# Patient Record
Sex: FEMALE | Race: WHITE | NOT HISPANIC OR LATINO | Employment: UNEMPLOYED | URBAN - METROPOLITAN AREA
[De-identification: names, ages, dates, MRNs, and addresses within clinical notes are randomized per-mention and may not be internally consistent; named-entity substitution may affect disease eponyms.]

---

## 2019-03-20 ENCOUNTER — TELEPHONE (OUTPATIENT)
Dept: NEUROLOGY | Facility: CLINIC | Age: 44
End: 2019-03-20

## 2019-03-20 NOTE — TELEPHONE ENCOUNTER
Patient given numbers for [facility], [facility] and Dr. Winter for a sooner appt.  Unable to put referral in Epic for migraines and seizures, cannot find Dr. Kumar Diamond.  Referral sent to scanning.

## 2019-03-20 NOTE — TELEPHONE ENCOUNTER
----- Message from Macey Alvarez sent at 3/20/2019  1:32 PM CDT -----  Contact: patient  Type: Needs Medical Advice    Who Called:  patient  Best Call Back Number: [phone]  Additional Information: would like for the nurse to give her a call back because the number that was given to her did not have any openings.

## 2019-03-20 NOTE — TELEPHONE ENCOUNTER
Patient notified that the schedule is not open past June and there are no more medicaid spots at this time.

## 2019-03-20 NOTE — TELEPHONE ENCOUNTER
----- Message from Jassi Monae sent at 3/20/2019 12:03 PM CDT -----  Contact: Patient  Advised she has been referred by her previous Dr. Kumar Diamond who faxed the referral to [phone] Attn: Trena for Dr. Pond.  The patient has not heard back from anyone to schedule an appnt.  Confirmed the correct fax number for Dr. Pond is [phone].  The ptnt has Medicaid   Please call [phone]

## 2022-08-11 ENCOUNTER — HOSPITAL ENCOUNTER (INPATIENT)
Facility: HOSPITAL | Age: 47
LOS: 7 days | Discharge: HOME | DRG: 442 | End: 2022-08-18
Attending: EMERGENCY MEDICINE | Admitting: INTERNAL MEDICINE
Payer: MEDICAID

## 2022-08-11 DIAGNOSIS — N39.0 URINARY TRACT INFECTION WITHOUT HEMATURIA, SITE UNSPECIFIED: ICD-10-CM

## 2022-08-11 DIAGNOSIS — R10.9 ABDOMINAL PAIN, UNSPECIFIED ABDOMINAL LOCATION: Primary | ICD-10-CM

## 2022-08-11 DIAGNOSIS — R07.9 CHEST PAIN: ICD-10-CM

## 2022-08-11 DIAGNOSIS — R17 JAUNDICE: ICD-10-CM

## 2022-08-11 DIAGNOSIS — K75.9 HEPATITIS: ICD-10-CM

## 2022-08-11 DIAGNOSIS — E80.6 HYPERBILIRUBINEMIA: ICD-10-CM

## 2022-08-11 DIAGNOSIS — R74.01 TRANSAMINITIS: ICD-10-CM

## 2022-08-11 PROBLEM — E87.6 HYPOKALEMIA: Status: ACTIVE | Noted: 2022-08-11

## 2022-08-11 LAB
ALBUMIN SERPL BCP-MCNC: 3.3 G/DL (ref 3.5–5.2)
ALP SERPL-CCNC: 115 U/L (ref 55–135)
ALT SERPL W/O P-5'-P-CCNC: 1319 U/L (ref 10–44)
ANION GAP SERPL CALC-SCNC: 8 MMOL/L (ref 8–16)
AST SERPL-CCNC: 1733 U/L (ref 10–40)
B-HCG UR QL: NEGATIVE
BACTERIA #/AREA URNS HPF: ABNORMAL /HPF
BASOPHILS # BLD AUTO: 0.07 K/UL (ref 0–0.2)
BASOPHILS NFR BLD: 1.2 % (ref 0–1.9)
BILIRUB SERPL-MCNC: 15.2 MG/DL (ref 0.1–1)
BILIRUB UR QL STRIP: ABNORMAL
BUN SERPL-MCNC: 8 MG/DL (ref 6–20)
CALCIUM SERPL-MCNC: 8.7 MG/DL (ref 8.7–10.5)
CHLORIDE SERPL-SCNC: 104 MMOL/L (ref 95–110)
CLARITY UR: ABNORMAL
CO2 SERPL-SCNC: 21 MMOL/L (ref 23–29)
COLOR UR: ABNORMAL
CREAT SERPL-MCNC: 0.5 MG/DL (ref 0.5–1.4)
CTP QC/QA: YES
DIFFERENTIAL METHOD: ABNORMAL
EOSINOPHIL # BLD AUTO: 0 K/UL (ref 0–0.5)
EOSINOPHIL NFR BLD: 0.3 % (ref 0–8)
ERYTHROCYTE [DISTWIDTH] IN BLOOD BY AUTOMATED COUNT: 19.2 % (ref 11.5–14.5)
EST. GFR  (NO RACE VARIABLE): >60 ML/MIN/1.73 M^2
GLUCOSE SERPL-MCNC: 122 MG/DL (ref 70–110)
GLUCOSE UR QL STRIP: ABNORMAL
HCT VFR BLD AUTO: 45.7 % (ref 37–48.5)
HGB BLD-MCNC: 15 G/DL (ref 12–16)
HGB UR QL STRIP: ABNORMAL
IMM GRANULOCYTES # BLD AUTO: 0.01 K/UL (ref 0–0.04)
IMM GRANULOCYTES NFR BLD AUTO: 0.2 % (ref 0–0.5)
KETONES UR QL STRIP: ABNORMAL
LEUKOCYTE ESTERASE UR QL STRIP: ABNORMAL
LIPASE SERPL-CCNC: 34 U/L (ref 4–60)
LYMPHOCYTES # BLD AUTO: 0.9 K/UL (ref 1–4.8)
LYMPHOCYTES NFR BLD: 15.8 % (ref 18–48)
MCH RBC QN AUTO: 27.8 PG (ref 27–31)
MCHC RBC AUTO-ENTMCNC: 32.8 G/DL (ref 32–36)
MCV RBC AUTO: 85 FL (ref 82–98)
MICROSCOPIC COMMENT: ABNORMAL
MONOCYTES # BLD AUTO: 0.7 K/UL (ref 0.3–1)
MONOCYTES NFR BLD: 11.8 % (ref 4–15)
NEUTROPHILS # BLD AUTO: 4.2 K/UL (ref 1.8–7.7)
NEUTROPHILS NFR BLD: 70.7 % (ref 38–73)
NITRITE UR QL STRIP: ABNORMAL
NRBC BLD-RTO: 0 /100 WBC
PH UR STRIP: ABNORMAL [PH] (ref 5–8)
PLATELET # BLD AUTO: 370 K/UL (ref 150–450)
PMV BLD AUTO: 9.3 FL (ref 9.2–12.9)
POTASSIUM SERPL-SCNC: 3.4 MMOL/L (ref 3.5–5.1)
PROT SERPL-MCNC: 6.6 G/DL (ref 6–8.4)
PROT UR QL STRIP: ABNORMAL
RBC # BLD AUTO: 5.4 M/UL (ref 4–5.4)
RBC #/AREA URNS HPF: 1 /HPF (ref 0–4)
SODIUM SERPL-SCNC: 133 MMOL/L (ref 136–145)
SP GR UR STRIP: ABNORMAL (ref 1–1.03)
SQUAMOUS #/AREA URNS HPF: 20 /HPF
URN SPEC COLLECT METH UR: ABNORMAL
UROBILINOGEN UR STRIP-ACNC: ABNORMAL EU/DL
WBC # BLD AUTO: 5.95 K/UL (ref 3.9–12.7)
WBC #/AREA URNS HPF: 40 /HPF (ref 0–5)
WBC CLUMPS URNS QL MICRO: ABNORMAL

## 2022-08-11 PROCEDURE — 25000003 PHARM REV CODE 250: Performed by: NURSE PRACTITIONER

## 2022-08-11 PROCEDURE — 81025 URINE PREGNANCY TEST: CPT | Performed by: EMERGENCY MEDICINE

## 2022-08-11 PROCEDURE — 25000003 PHARM REV CODE 250: Performed by: EMERGENCY MEDICINE

## 2022-08-11 PROCEDURE — 25000003 PHARM REV CODE 250: Performed by: INTERNAL MEDICINE

## 2022-08-11 PROCEDURE — 83690 ASSAY OF LIPASE: CPT | Performed by: EMERGENCY MEDICINE

## 2022-08-11 PROCEDURE — 99285 EMERGENCY DEPT VISIT HI MDM: CPT | Mod: 25

## 2022-08-11 PROCEDURE — 63600175 PHARM REV CODE 636 W HCPCS: Performed by: NURSE PRACTITIONER

## 2022-08-11 PROCEDURE — 12000002 HC ACUTE/MED SURGE SEMI-PRIVATE ROOM

## 2022-08-11 PROCEDURE — 96365 THER/PROPH/DIAG IV INF INIT: CPT

## 2022-08-11 PROCEDURE — G0378 HOSPITAL OBSERVATION PER HR: HCPCS

## 2022-08-11 PROCEDURE — 25500020 PHARM REV CODE 255: Performed by: EMERGENCY MEDICINE

## 2022-08-11 PROCEDURE — 80053 COMPREHEN METABOLIC PANEL: CPT | Performed by: EMERGENCY MEDICINE

## 2022-08-11 PROCEDURE — 85025 COMPLETE CBC W/AUTO DIFF WBC: CPT | Performed by: EMERGENCY MEDICINE

## 2022-08-11 PROCEDURE — 81001 URINALYSIS AUTO W/SCOPE: CPT | Performed by: EMERGENCY MEDICINE

## 2022-08-11 PROCEDURE — 63600175 PHARM REV CODE 636 W HCPCS: Performed by: EMERGENCY MEDICINE

## 2022-08-11 PROCEDURE — 96366 THER/PROPH/DIAG IV INF ADDON: CPT

## 2022-08-11 PROCEDURE — 96375 TX/PRO/DX INJ NEW DRUG ADDON: CPT

## 2022-08-11 PROCEDURE — 87086 URINE CULTURE/COLONY COUNT: CPT | Performed by: EMERGENCY MEDICINE

## 2022-08-11 RX ORDER — NALOXONE HCL 0.4 MG/ML
0.02 VIAL (ML) INJECTION
Status: DISCONTINUED | OUTPATIENT
Start: 2022-08-11 | End: 2022-08-18 | Stop reason: HOSPADM

## 2022-08-11 RX ORDER — TALC
6 POWDER (GRAM) TOPICAL NIGHTLY PRN
Status: DISCONTINUED | OUTPATIENT
Start: 2022-08-11 | End: 2022-08-18 | Stop reason: HOSPADM

## 2022-08-11 RX ORDER — IBUPROFEN 200 MG
16 TABLET ORAL
Status: DISCONTINUED | OUTPATIENT
Start: 2022-08-11 | End: 2022-08-18 | Stop reason: HOSPADM

## 2022-08-11 RX ORDER — RIMEGEPANT SULFATE 75 MG/75MG
75 TABLET, ORALLY DISINTEGRATING ORAL ONCE AS NEEDED
Status: ON HOLD | COMMUNITY
End: 2022-08-18 | Stop reason: HOSPADM

## 2022-08-11 RX ORDER — PROMETHAZINE HYDROCHLORIDE 25 MG/1
25 TABLET ORAL 3 TIMES DAILY PRN
Status: ON HOLD | COMMUNITY
End: 2022-08-18 | Stop reason: HOSPADM

## 2022-08-11 RX ORDER — SODIUM CHLORIDE 9 MG/ML
INJECTION, SOLUTION INTRAVENOUS CONTINUOUS
Status: DISCONTINUED | OUTPATIENT
Start: 2022-08-11 | End: 2022-08-14

## 2022-08-11 RX ORDER — HYDROMORPHONE HYDROCHLORIDE 1 MG/ML
0.5 INJECTION, SOLUTION INTRAMUSCULAR; INTRAVENOUS; SUBCUTANEOUS ONCE
Status: COMPLETED | OUTPATIENT
Start: 2022-08-11 | End: 2022-08-11

## 2022-08-11 RX ORDER — UBROGEPANT 100 MG/1
TABLET ORAL
Status: ON HOLD | COMMUNITY
End: 2022-08-18 | Stop reason: HOSPADM

## 2022-08-11 RX ORDER — IBUPROFEN 200 MG
24 TABLET ORAL
Status: DISCONTINUED | OUTPATIENT
Start: 2022-08-11 | End: 2022-08-18 | Stop reason: HOSPADM

## 2022-08-11 RX ORDER — PROCHLORPERAZINE EDISYLATE 5 MG/ML
5 INJECTION INTRAMUSCULAR; INTRAVENOUS EVERY 6 HOURS PRN
Status: DISCONTINUED | OUTPATIENT
Start: 2022-08-11 | End: 2022-08-18 | Stop reason: HOSPADM

## 2022-08-11 RX ORDER — SODIUM CHLORIDE 0.9 % (FLUSH) 0.9 %
3 SYRINGE (ML) INJECTION EVERY 8 HOURS
Status: DISCONTINUED | OUTPATIENT
Start: 2022-08-11 | End: 2022-08-17

## 2022-08-11 RX ORDER — POTASSIUM CHLORIDE 20 MEQ/1
40 TABLET, EXTENDED RELEASE ORAL ONCE
Status: DISCONTINUED | OUTPATIENT
Start: 2022-08-11 | End: 2022-08-11

## 2022-08-11 RX ORDER — GLUCAGON 1 MG
1 KIT INJECTION
Status: DISCONTINUED | OUTPATIENT
Start: 2022-08-11 | End: 2022-08-18 | Stop reason: HOSPADM

## 2022-08-11 RX ORDER — ACETAMINOPHEN 325 MG/1
650 TABLET ORAL EVERY 4 HOURS PRN
Status: DISCONTINUED | OUTPATIENT
Start: 2022-08-11 | End: 2022-08-11

## 2022-08-11 RX ORDER — MAG HYDROX/ALUMINUM HYD/SIMETH 200-200-20
30 SUSPENSION, ORAL (FINAL DOSE FORM) ORAL 4 TIMES DAILY PRN
Status: DISCONTINUED | OUTPATIENT
Start: 2022-08-11 | End: 2022-08-18 | Stop reason: HOSPADM

## 2022-08-11 RX ORDER — ONDANSETRON 2 MG/ML
4 INJECTION INTRAMUSCULAR; INTRAVENOUS EVERY 8 HOURS PRN
Status: DISCONTINUED | OUTPATIENT
Start: 2022-08-11 | End: 2022-08-11

## 2022-08-11 RX ORDER — ONDANSETRON 2 MG/ML
4 INJECTION INTRAMUSCULAR; INTRAVENOUS EVERY 8 HOURS PRN
Status: DISCONTINUED | OUTPATIENT
Start: 2022-08-11 | End: 2022-08-18 | Stop reason: HOSPADM

## 2022-08-11 RX ORDER — OXYCODONE HYDROCHLORIDE 5 MG/1
5 TABLET ORAL EVERY 4 HOURS PRN
Status: DISCONTINUED | OUTPATIENT
Start: 2022-08-11 | End: 2022-08-18 | Stop reason: HOSPADM

## 2022-08-11 RX ORDER — GALCANEZUMAB 120 MG/ML
120 INJECTION, SOLUTION SUBCUTANEOUS
Status: ON HOLD | COMMUNITY
End: 2022-08-18 | Stop reason: HOSPADM

## 2022-08-11 RX ADMIN — IOHEXOL 100 ML: 350 INJECTION, SOLUTION INTRAVENOUS at 02:08

## 2022-08-11 RX ADMIN — ONDANSETRON 4 MG: 2 INJECTION INTRAMUSCULAR; INTRAVENOUS at 02:08

## 2022-08-11 RX ADMIN — POTASSIUM BICARBONATE 40 MEQ: 391 TABLET, EFFERVESCENT ORAL at 02:08

## 2022-08-11 RX ADMIN — SODIUM CHLORIDE: 0.9 INJECTION, SOLUTION INTRAVENOUS at 02:08

## 2022-08-11 RX ADMIN — HYDROMORPHONE HYDROCHLORIDE 0.5 MG: 1 INJECTION, SOLUTION INTRAMUSCULAR; INTRAVENOUS; SUBCUTANEOUS at 02:08

## 2022-08-11 RX ADMIN — OXYCODONE HYDROCHLORIDE 5 MG: 5 TABLET ORAL at 08:08

## 2022-08-11 RX ADMIN — CEFTRIAXONE 1 G: 1 INJECTION, SOLUTION INTRAVENOUS at 02:08

## 2022-08-11 NOTE — ASSESSMENT & PLAN NOTE
Patient presented to ED with c/o Abdominal pain for several weeks. She has significant medical history but migraines.   Bilirubin 15.2, AST 1733, ALT 1319, Lipase 34  Hepatitis panel pending.   Abdominal ultrasound impression  Normal size and sonographic appearance of the liver.   2. Pronounced gallbladder thickening without gallstones, nonspecific. This could be due to intrinsic liver disease and or hypoproteinemia, or acalculous cholecystitis.   3. Multiple simple right renal cysts.   CT Abdomen/Pelvis pending  GI consulted. Appreciate rec's  Zofran IV prn   NS @100ccc/hr  Trend labs

## 2022-08-11 NOTE — HPI
Ms. Pat is a 47-year-old female with PMHx chronic migraines and a remote history of seizure disorder. She presented to [facility] Emergency room with complaints abdominal pain for several weeks. Associated symptoms nausea and vomiting. Today she needed to come to the ED because she was becoming lethargic at times due to abdominal pain. She endorses being anorectic and has lost 10 lbs is during this time. She endorses being very fatigued and not being able to do her daily functioning. Her stools have been light and urine dark. She denies any history of hepatitis, pancreatitis, or other GI diseases. Patient denies being around anyone sick or going out of the country. He diet has not changed.Denies fever or chills. In the ED her initial workup Potassium 3.4, Sodium 133, Bilirubin 15.2, AST 1433, ALT 1319, Lipase 34. Urinalysis resulted WBC 40, few bacteria. She was treated in the ED with Rocephin 1g IV. Abdominal ultrasound impressions were gallbladder thickening with no stones. GI has been consulted. Abdominal/pelvis CT pending, Hepatitis panel pending. Patient will be admitted to Hospital Medicine for further workup and medical management.

## 2022-08-11 NOTE — ED PROVIDER NOTES
Encounter Date: 8/11/2022       History     Chief Complaint   Patient presents with    Abdominal Pain    Jaundice     47-year-old female who has a prior history of chronic migraines and a remote history of seizure disorder, presents emergency room with complaints of having mid abdominal pain for the last couple of weeks.  Patient states that 3-4 days ago she noted that she was becoming jaundiced.  She denies any fever chills.  No flank pain.  No known history of any gallbladder or liver disease.  Patient admits being anorectic and has lost 10 lb over the last couple weeks.  She has had nausea and vomiting.  She admits that stools have been light in appearance and urine is dark.  She does also complain of increased fatigue.  No prior history of hepatitis, pancreatitis or peptic ulcer disease.  She denies any ill contacts with any other 1 with similar symptoms.        Review of patient's allergies indicates:  No Known Allergies  No past medical history on file.  No past surgical history on file.  No family history on file.     Review of Systems   Constitutional: Positive for appetite change. Negative for chills, diaphoresis and fever.   HENT: Negative.  Negative for sore throat and trouble swallowing.    Respiratory: Negative for cough and shortness of breath.    Cardiovascular: Negative for chest pain and palpitations.   Gastrointestinal: Positive for abdominal pain, nausea and vomiting.   Genitourinary: Negative for difficulty urinating and dysuria.   Musculoskeletal: Negative for back pain.   Skin: Positive for color change. Negative for rash.   Neurological: Negative for weakness.   Hematological: Does not bruise/bleed easily.   All other systems reviewed and are negative.      Physical Exam     Initial Vitals [08/11/22 1015]   BP Pulse Resp Temp SpO2   133/66 95 18 98.2 °F (36.8 °C) 98 %      MAP       --         Physical Exam    Vitals reviewed.  Constitutional: She appears well-developed and well-nourished.  She is not diaphoretic. No distress.   HENT:   Head: Normocephalic and atraumatic.   Nose: Nose normal.   Mouth/Throat: Oropharynx is clear and moist. No oropharyngeal exudate.   Eyes: Conjunctivae are normal. Pupils are equal, round, and reactive to light.   Neck: Neck supple. No JVD present.   Normal range of motion.  Cardiovascular: Normal rate, regular rhythm, normal heart sounds and intact distal pulses. Exam reveals no gallop and no friction rub.    No murmur heard.  Pulmonary/Chest: Breath sounds normal. No respiratory distress. She has no wheezes. She has no rhonchi. She has no rales. She exhibits no tenderness.   Abdominal: Abdomen is soft. Bowel sounds are normal. She exhibits no distension. There is abdominal tenderness. There is no rebound and no guarding.   Musculoskeletal:         General: No tenderness or edema. Normal range of motion.      Cervical back: Normal range of motion and neck supple.     Lymphadenopathy:     She has no cervical adenopathy.   Neurological: She is alert and oriented to person, place, and time. She has normal strength. GCS score is 15. GCS eye subscore is 4. GCS verbal subscore is 5. GCS motor subscore is 6.   Skin: Skin is warm and dry. Capillary refill takes less than 2 seconds. No rash noted. No erythema. No pallor.   Icteric   Psychiatric: She has a normal mood and affect. Her behavior is normal. Judgment and thought content normal.         ED Course   Procedures  Labs Reviewed   CBC W/ AUTO DIFFERENTIAL - Abnormal; Notable for the following components:       Result Value    RDW 19.2 (*)     Lymph # 0.9 (*)     Lymph % 15.8 (*)     All other components within normal limits   COMPREHENSIVE METABOLIC PANEL - Abnormal; Notable for the following components:    Sodium 133 (*)     Potassium 3.4 (*)     CO2 21 (*)     Glucose 122 (*)     Albumin 3.3 (*)     Total Bilirubin 15.2 (*)     AST 1,733 (*)     ALT 1,319 (*)     All other components within normal limits   URINALYSIS,  REFLEX TO URINE CULTURE - Abnormal; Notable for the following components:    Appearance, UA Hazy (*)     Bilirubin (UA) 3+ (*)     All other components within normal limits    Narrative:     Specimen Source->Urine   URINALYSIS MICROSCOPIC - Abnormal; Notable for the following components:    WBC, UA 40 (*)     WBC Clumps, UA Few (*)     Bacteria Few (*)     All other components within normal limits    Narrative:     Specimen Source->Urine   CULTURE, URINE   LIPASE   HEPATITIS PANEL, ACUTE   POCT URINE PREGNANCY          Imaging Results          CT Abdomen Pelvis With Contrast (Final result)  Result time 08/11/22 15:18:30    Final result by Bev Hernandez IV, MD (08/11/22 15:18:30)                 Narrative:    All CT scans at this facility use dose modulation, degenerative reconstruction  and/or weight-based dosing when appropriate to reduce radiation dose to as low as reasonably achievable.    CT of the abdomen with contrast and CT of the pelvis with contrast    HISTORY: Jaundice. Abdomen pain.    The study utilizes 100 cc of intravenous nonionic contrast material.      The liver and spleen are normal in size. No focal hepatic parenchymal abnormalities are identified.    As previously noted, there is diffuse gallbladder wall thickening/pericholecystic edema. No calcified gallstones are identified. There is no definite choledocholithiasis. No biliary dilatation is observed. Correlate with evidence of acalculus cholecystitis or hepatocellular disease.    A 7 mm nodular soft tissue density observed anterior to the gallbladder could reflect a mildly prominent lymph node.    No pancreatic or adrenal abnormality is identified. There is a small amount of free fluid within the abdomen and pelvis.    The left kidney is normal in size and enhances appropriately. There are probable subcentimeter cyst within the mid cortex of the left kidney. There are nonobstructing calculi in the mid and lower pole left kidney measuring up to  7 mm.    There is an apparent partial duplication of the collecting system of the right kidney. There is obstruction of the lower pole moiety by a large proximal ureteral calculus which measures approximately 17 mm in longitudinal dimension and up to 10 mm transversely. There is prominent dilatation of the collecting system of the mid and lower pole of the right kidney. There is diffuse cortical thinning involving the lower right kidney. The upper pole collecting system is of normal caliber. There is a probable subcentimeter cyst within the mid right kidney.    The aorta tapers appropriately. There is no bowel wall thickening or evidence of obstruction.    The uterus, adnexa and urinary bladder are unremarkable.    The lung bases are appropriately aerated. No acute osseous abnormality is observed.    IMPRESSION:    Partially contracted gallbladder demonstrating diffuse wall thickening and pericholecystic edema. A small enhancing nodule anterior to the gallbladder could reflect a mildly prominent lymph node. Correlate with evidence of hepatocellular disease or acalculus cholecystitis.    Duplicated right renal collecting system with chronic obstruction of the lower pole moiety by a large proximal right ureteral calculus. There is diffuse cortical thinning involving mid to lower right kidney. There are nonobstructing left renal calculi.    Small amount of ascites.    Electronically signed by:  Bev Hernandez MD  8/11/2022 3:18 PM CDT Workstation: [id number]                             US Abdomen Limited (Gallbladder) (Final result)  Result time 08/11/22 12:44:17    Final result by Raulito Alamo MD (08/11/22 12:44:17)                 Narrative:    HISTORY: Jaundice.    FINDINGS: No prior studies for comparison. The visualized pancreas has normal echotexture with no peripancreatic fluid collections. The liver is normal in size and echotexture, 17 cm in length, without focal lesions or intrahepatic biliary ductal  dilatation.    There is pronounced diffuse gallbladder wall thickening, without echogenic shadowing gallstones or sludge identified. The common duct is normal measuring 3 mm in diameter.    The right kidney measures 10.9 cm in length, with multiple circumscribed anechoic simple cysts, and no echogenic calculi or hydronephrosis. The visualized abdominal aorta, IVC and main portal vein are normal. There is no ascites.    IMPRESSION:  1. Normal size and sonographic appearance of the liver.  2. Pronounced gallbladder thickening without gallstones, nonspecific. This could be due to intrinsic liver disease and or hypoproteinemia, or acalculous cholecystitis.  3. Multiple simple right renal cysts.    Electronically signed by:  Raulito Alamo MD  8/11/2022 12:44 PM CDT Workstation: [id number]                               Medications   cefTRIAXone (ROCEPHIN) 1 g/50 mL D5W IVPB (0 g Intravenous Hold 8/11/22 1500)   0.9%  NaCl infusion ( Intravenous New Bag 8/11/22 1450)   ondansetron injection 4 mg (4 mg Intravenous Given 8/11/22 1450)   cefTRIAXone (ROCEPHIN) 1 g/50 mL D5W IVPB (1 g Intravenous New Bag 8/11/22 1449)   HYDROmorphone injection 0.5 mg (0.5 mg Intravenous Given 8/11/22 1450)   iohexoL (OMNIPAQUE 350) injection 100 mL (100 mLs Intravenous Given 8/11/22 1434)   potassium bicarbonate disintegrating tablet 40 mEq (40 mEq Oral Given 8/11/22 1456)                          Clinical Impression:   Final diagnoses:  [R17] Jaundice  [R10.9] Abdominal pain, unspecified abdominal location (Primary)  [R74.01] Transaminitis  [E80.6] Hyperbilirubinemia  [N39.0] Urinary tract infection without hematuria, site unspecified          ED Disposition Condition    Observation               Teddy Duenas Jr., MD  08/11/22 0155

## 2022-08-11 NOTE — H&P
[facility] - Emergency Dept  Hospital Medicine  History & Physical    Patient Name: Kina Pat  MRN: [id number]  Patient Class: OP- Observation  Admission Date: 8/11/2022  Attending Physician: Bailey Love MD  Primary Care Provider: Primary Doctor No         Patient information was obtained from patient and ER records.     Subjective:     Principal Problem:Jaundice    Chief Complaint:   Chief Complaint   Patient presents with    Abdominal Pain    Jaundice        HPI: Ms. Pat is a 47-year-old female with PMHx chronic migraines and a remote history of seizure disorder. She presented [place] room with complaints abdominal pain for several weeks. Associated symptoms nausea and vomiting. Today she needed to come to the ED because she was becoming lethargic at times due to abdominal pain. She endorses being anorectic and has lost 10 lbs is during this time. She endorses being very fatigued and not being able to do her daily functioning. Her stools have been light and urine dark. She denies any history of hepatitis, pancreatitis, or other GI diseases. Patient denies being around anyone sick or going out of the country. He diet has not changed.Denies fever or chills. In the ED her initial workup Potassium 3.4, Sodium 133, Bilirubin 15.2, AST 1433, ALT 1319, Lipase 34. Urinalysis resulted WBC 40, few bacteria. She was treated in the ED with Rocephin 1g IV. Abdominal ultrasound impressions were gallbladder thickening with no stones. GI has been consulted. Abdominal/pelvis CT pending, Hepatitis panel pending. Patient will be admitted to Hospital Medicine for further workup and medical management.       No past medical history on file.    No past surgical history on file.    Review of patient's allergies indicates:  No Known Allergies    No current facility-administered medications on file prior to encounter.     Current Outpatient Medications on File Prior to Encounter    Medication Sig    promethazine (PHENERGAN) 25 MG tablet Take 25 mg by mouth 3 (three) times daily as needed.    rimegepant (NURTEC) 75 mg odt Take 75 mg by mouth once as needed.    galcanezumab-gnlm (EMGALITY PEN) 120 mg/mL PnIj 120 mg.    ubrogepant (UBRELVY) 100 mg tablet Ubrelvy 100 mg tablet   1 po at ha onset. Repeat after 2 hours if needed. No more than 2/day     Family History    None       Tobacco Use    Smoking status: Not on file    Smokeless tobacco: Not on file   Substance and Sexual Activity    Alcohol use: Not on file    Drug use: Not on file    Sexual activity: Not on file     Review of Systems   Constitutional:  Positive for activity change, appetite change and fatigue. Negative for chills and fever.   Respiratory:  Negative for chest tightness and shortness of breath.    Cardiovascular:  Negative for chest pain and palpitations.   Gastrointestinal:  Positive for abdominal pain, nausea and vomiting.   Genitourinary:  Negative for flank pain.   Neurological:  Positive for headaches.   Psychiatric/Behavioral:          Lethargic   Objective:     Vital Signs (Most Recent):  Temp: 98.2 °F (36.8 °C) (08/11/22 1015)  Pulse: 84 (08/11/22 1400)  Resp: 18 (08/11/22 1015)  BP: 122/71 (08/11/22 1400)  SpO2: 98 % (08/11/22 1400) Vital Signs (24h Range):  Temp:  [98.2 °F (36.8 °C)] 98.2 °F (36.8 °C)  Pulse:  [83-95] 84  Resp:  [18] 18  SpO2:  [98 %-100 %] 98 %  BP: (122-133)/(65-76) 122/71     Weight: 90.7 kg (200 lb)  Body mass index is 32.28 kg/m².    Physical Exam  Constitutional:       Appearance: Normal appearance.   HENT:      Head: Normocephalic and atraumatic.   Eyes:      Pupils: Pupils are equal, round, and reactive to light.   Cardiovascular:      Rate and Rhythm: Normal rate and regular rhythm.      Pulses: Normal pulses.   Pulmonary:      Effort: Pulmonary effort is normal.      Breath sounds: Normal breath sounds.   Abdominal:      General: Bowel sounds are normal.      Tenderness: There  is no abdominal tenderness. There is no guarding.   Musculoskeletal:         General: Normal range of motion.      Cervical back: Neck supple.   Skin:     Coloration: Skin is jaundiced.   Neurological:      Mental Status: She is alert and oriented to person, place, and time.   Psychiatric:         Mood and Affect: Mood normal.         Behavior: Behavior normal.         Thought Content: Thought content normal.         CRANIAL NERVES     CN III, IV, VI   Pupils are equal, round, and reactive to light.     Significant Labs: All pertinent labs within the past 24 hours have been reviewed.  Recent Lab Results         08/11/22  1429   08/11/22  1019        Albumin   3.3       Alkaline Phosphatase   115       ALT   1,319       Anion Gap   8       Appearance, UA   Hazy       AST   1,733       Bacteria, UA   Few       Baso #   0.07       Basophil %   1.2       Bilirubin (UA)   3+  Comment: Positive urine bilirubin is not confirmed. Correlate with   serum bilirubin and clinical presentation.         BILIRUBIN TOTAL   15.2  Comment: For infants and newborns, interpretation of results should be based  on gestational age, weight and in agreement with clinical  observations.    Premature Infant recommended reference ranges:  Up to 24 hours.............<8.0 mg/dL  Up to 48 hours............<12.0 mg/dL  3-5 days..................<15.0 mg/dL  6-29 days.................<15.0 mg/dL         BUN   8       Calcium   8.7       Chloride   104       CO2   21       Color, UA   Yumiko       Creatinine   0.5       Differential Method   Automated       eGFR   >60.0       Eos #   0.0       Eosinophil %   0.3       Glucose   122       Glucose, UA   SEE COMMENT  Comment: Color may interfere with results       Gran # (ANC)   4.2       Gran %   70.7       Hematocrit   45.7       Hemoglobin   15.0       Immature Grans (Abs)   0.01  Comment: Mild elevation in immature granulocytes is non specific and   can be seen in a variety of conditions including  stress response,   acute inflammation, trauma and pregnancy. Correlation with other   laboratory and clinical findings is essential.         Immature Granulocytes   0.2       Ketones, UA   SEE COMMENT  Comment: Color may interfere with results       Leukocytes, UA   SEE COMMENT  Comment: Color may interfere with results       Lipase   34       Lymph #   0.9       Lymph %   15.8       MCH   27.8       MCHC   32.8       MCV   85       Microscopic Comment   SEE COMMENT  Comment: Other formed elements not mentioned in the report are not   present in the microscopic examination.          Mono #   0.7       Mono %   11.8       MPV   9.3       NITRITE UA   SEE COMMENT  Comment: Color may interfere with results       nRBC   0       Occult Blood UA   SEE COMMENT  Comment: Color may interfere with results       pH, UA   SEE COMMENT  Comment: Color may interfere with results       Platelets   370       Potassium   3.4       Preg Test, Ur Negative         PROTEIN TOTAL   6.6       Protein, UA   SEE COMMENT  Comment: Recommend a 24 hour urine protein or a urine   protein/creatinine ratio if globulin induced proteinuria is  clinically suspected.  Color may interfere with results          Acceptable Yes         RBC   5.40       RBC, UA   1       RDW   19.2       Sodium   133       Specific [place], UA   SEE COMMENT  Comment: Color may interfere with results       Specimen UA   Urine, Clean Catch       Squam Epithel, UA   20       UROBILINOGEN UA   SEE COMMENT  Comment: Color may interfere with results       WBC Clumps, UA   Few       WBC, UA   40       WBC   5.95               Significant Imaging: I have reviewed all pertinent imaging results/findings within the past 24 hours.    Assessment/Plan:     * Jaundice  Patient presented to ED with c/o Abdominal pain for several weeks. She has significant medical history but migraines.   Bilirubin 15.2, AST 1733, ALT 1319, Lipase 34  Hepatitis panel pending.   Abdominal  ultrasound impression  Normal size and sonographic appearance of the liver.   2. Pronounced gallbladder thickening without gallstones, nonspecific. This could be due to intrinsic liver disease and or hypoproteinemia, or acalculous cholecystitis.   3. Multiple simple right renal cysts.   CT Abdomen/Pelvis pending  GI consulted. Appreciate rec's  Zofran IV prn   NS @100ccc/hr  Trend labs          VTE Risk Mitigation (From admission, onward)    None             Felicia Sorenson NP  Department of Hospital Medicine   [facility] - Emergency Dept

## 2022-08-11 NOTE — SUBJECTIVE & OBJECTIVE
No past medical history on file.    No past surgical history on file.    Review of patient's allergies indicates:  No Known Allergies    No current facility-administered medications on file prior to encounter.     Current Outpatient Medications on File Prior to Encounter   Medication Sig    promethazine (PHENERGAN) 25 MG tablet Take 25 mg by mouth 3 (three) times daily as needed.    rimegepant (NURTEC) 75 mg odt Take 75 mg by mouth once as needed.    galcanezumab-gnlm (EMGALITY PEN) 120 mg/mL PnIj 120 mg.    ubrogepant (UBRELVY) 100 mg tablet Ubrelvy 100 mg tablet   1 po at ha onset. Repeat after 2 hours if needed. No more than 2/day     Family History    None       Tobacco Use    Smoking status: Not on file    Smokeless tobacco: Not on file   Substance and Sexual Activity    Alcohol use: Not on file    Drug use: Not on file    Sexual activity: Not on file     Review of Systems   Constitutional:  Positive for activity change, appetite change and fatigue. Negative for chills and fever.   Respiratory:  Negative for chest tightness and shortness of breath.    Cardiovascular:  Negative for chest pain and palpitations.   Gastrointestinal:  Positive for abdominal pain, nausea and vomiting.   Genitourinary:  Negative for flank pain.   Neurological:  Positive for headaches.   Psychiatric/Behavioral:          Lethargic   Objective:     Vital Signs (Most Recent):  Temp: 98.2 °F (36.8 °C) (08/11/22 1015)  Pulse: 84 (08/11/22 1400)  Resp: 18 (08/11/22 1015)  BP: 122/71 (08/11/22 1400)  SpO2: 98 % (08/11/22 1400) Vital Signs (24h Range):  Temp:  [98.2 °F (36.8 °C)] 98.2 °F (36.8 °C)  Pulse:  [83-95] 84  Resp:  [18] 18  SpO2:  [98 %-100 %] 98 %  BP: (122-133)/(65-76) 122/71     Weight: 90.7 kg (200 lb)  Body mass index is 32.28 kg/m².    Physical Exam  Constitutional:       Appearance: Normal appearance.   HENT:      Head: Normocephalic and atraumatic.   Eyes:      Pupils: Pupils are equal, round, and reactive to light.    Cardiovascular:      Rate and Rhythm: Normal rate and regular rhythm.      Pulses: Normal pulses.   Pulmonary:      Effort: Pulmonary effort is normal.      Breath sounds: Normal breath sounds.   Abdominal:      General: Bowel sounds are normal.      Tenderness: There is no abdominal tenderness. There is no guarding.   Musculoskeletal:         General: Normal range of motion.      Cervical back: Neck supple.   Skin:     Coloration: Skin is jaundiced.   Neurological:      Mental Status: She is alert and oriented to person, place, and time.   Psychiatric:         Mood and Affect: Mood normal.         Behavior: Behavior normal.         Thought Content: Thought content normal.         CRANIAL NERVES     CN III, IV, VI   Pupils are equal, round, and reactive to light.     Significant Labs: All pertinent labs within the past 24 hours have been reviewed.  Recent Lab Results         08/11/22  1429   08/11/22  1019        Albumin   3.3       Alkaline Phosphatase   115       ALT   1,319       Anion Gap   8       Appearance, UA   Hazy       AST   1,733       Bacteria, UA   Few       Baso #   0.07       Basophil %   1.2       Bilirubin (UA)   3+  Comment: Positive urine bilirubin is not confirmed. Correlate with   serum bilirubin and clinical presentation.         BILIRUBIN TOTAL   15.2  Comment: For infants and newborns, interpretation of results should be based  on gestational age, weight and in agreement with clinical  observations.    Premature Infant recommended reference ranges:  Up to 24 hours.............<8.0 mg/dL  Up to 48 hours............<12.0 mg/dL  3-5 days..................<15.0 mg/dL  6-29 days.................<15.0 mg/dL         BUN   8       Calcium   8.7       Chloride   104       CO2   21       Color, UA   Yumiko       Creatinine   0.5       Differential Method   Automated       eGFR   >60.0       Eos #   0.0       Eosinophil %   0.3       Glucose   122       Glucose, UA   SEE COMMENT  Comment: Color may  interfere with results       Gran # (ANC)   4.2       Gran %   70.7       Hematocrit   45.7       Hemoglobin   15.0       Immature Grans (Abs)   0.01  Comment: Mild elevation in immature granulocytes is non specific and   can be seen in a variety of conditions including stress response,   acute inflammation, trauma and pregnancy. Correlation with other   laboratory and clinical findings is essential.         Immature Granulocytes   0.2       Ketones, UA   SEE COMMENT  Comment: Color may interfere with results       Leukocytes, UA   SEE COMMENT  Comment: Color may interfere with results       Lipase   34       Lymph #   0.9       Lymph %   15.8       MCH   27.8       MCHC   32.8       MCV   85       Microscopic Comment   SEE COMMENT  Comment: Other formed elements not mentioned in the report are not   present in the microscopic examination.          Mono #   0.7       Mono %   11.8       MPV   9.3       NITRITE UA   SEE COMMENT  Comment: Color may interfere with results       nRBC   0       Occult Blood UA   SEE COMMENT  Comment: Color may interfere with results       pH, UA   SEE COMMENT  Comment: Color may interfere with results       Platelets   370       Potassium   3.4       Preg Test, Ur Negative         PROTEIN TOTAL   6.6       Protein, UA   SEE COMMENT  Comment: Recommend a 24 hour urine protein or a urine   protein/creatinine ratio if globulin induced proteinuria is  clinically suspected.  Color may interfere with results          Acceptable Yes         RBC   5.40       RBC, UA   1       RDW   19.2       Sodium   133       Specific [place], UA   SEE COMMENT  Comment: Color may interfere with results       Specimen UA   Urine, Clean Catch       Squam Epithel, UA   20       UROBILINOGEN UA   SEE COMMENT  Comment: Color may interfere with results       WBC Clumps, UA   Few       WBC, UA   40       WBC   5.95               Significant Imaging: I have reviewed all pertinent imaging  results/findings within the past 24 hours.

## 2022-08-12 PROBLEM — K75.9 HEPATITIS: Status: ACTIVE | Noted: 2022-08-12

## 2022-08-12 PROBLEM — E87.1 HYPONATREMIA: Status: ACTIVE | Noted: 2022-08-12

## 2022-08-12 LAB
ALBUMIN SERPL BCP-MCNC: 2.8 G/DL (ref 3.5–5.2)
ALP SERPL-CCNC: 90 U/L (ref 55–135)
ALT SERPL W/O P-5'-P-CCNC: 1331 U/L (ref 10–44)
AMPHET+METHAMPHET UR QL: NEGATIVE
ANION GAP SERPL CALC-SCNC: 7 MMOL/L (ref 8–16)
APAP SERPL-MCNC: <10 UG/ML (ref 10–20)
AST SERPL-CCNC: 1931 U/L (ref 10–40)
BARBITURATES UR QL SCN>200 NG/ML: NEGATIVE
BASOPHILS # BLD AUTO: 0.07 K/UL (ref 0–0.2)
BASOPHILS NFR BLD: 1.3 % (ref 0–1.9)
BENZODIAZ UR QL SCN>200 NG/ML: NEGATIVE
BILIRUB SERPL-MCNC: 14.8 MG/DL (ref 0.1–1)
BUN SERPL-MCNC: 7 MG/DL (ref 6–20)
BZE UR QL SCN: NEGATIVE
CALCIUM SERPL-MCNC: 8.4 MG/DL (ref 8.7–10.5)
CANNABINOIDS UR QL SCN: NEGATIVE
CHLORIDE SERPL-SCNC: 102 MMOL/L (ref 95–110)
CO2 SERPL-SCNC: 24 MMOL/L (ref 23–29)
CREAT SERPL-MCNC: 0.3 MG/DL (ref 0.5–1.4)
CREAT UR-MCNC: 205 MG/DL (ref 15–325)
DIFFERENTIAL METHOD: ABNORMAL
EOSINOPHIL # BLD AUTO: 0.1 K/UL (ref 0–0.5)
EOSINOPHIL NFR BLD: 1.9 % (ref 0–8)
ERYTHROCYTE [DISTWIDTH] IN BLOOD BY AUTOMATED COUNT: 19.6 % (ref 11.5–14.5)
EST. GFR  (NO RACE VARIABLE): >60 ML/MIN/1.73 M^2
GLUCOSE SERPL-MCNC: 87 MG/DL (ref 70–110)
HCT VFR BLD AUTO: 42.1 % (ref 37–48.5)
HGB BLD-MCNC: 13.5 G/DL (ref 12–16)
IMM GRANULOCYTES # BLD AUTO: 0.02 K/UL (ref 0–0.04)
IMM GRANULOCYTES NFR BLD AUTO: 0.4 % (ref 0–0.5)
INR PPP: 1.2
LYMPHOCYTES # BLD AUTO: 1.5 K/UL (ref 1–4.8)
LYMPHOCYTES NFR BLD: 29.1 % (ref 18–48)
MCH RBC QN AUTO: 27.2 PG (ref 27–31)
MCHC RBC AUTO-ENTMCNC: 32.1 G/DL (ref 32–36)
MCV RBC AUTO: 85 FL (ref 82–98)
MONOCYTES # BLD AUTO: 0.7 K/UL (ref 0.3–1)
MONOCYTES NFR BLD: 13.9 % (ref 4–15)
NEUTROPHILS # BLD AUTO: 2.8 K/UL (ref 1.8–7.7)
NEUTROPHILS NFR BLD: 53.4 % (ref 38–73)
NRBC BLD-RTO: 0 /100 WBC
OPIATES UR QL SCN: ABNORMAL
PCP UR QL SCN>25 NG/ML: NEGATIVE
PLATELET # BLD AUTO: 319 K/UL (ref 150–450)
PMV BLD AUTO: 9.5 FL (ref 9.2–12.9)
POTASSIUM SERPL-SCNC: 3.5 MMOL/L (ref 3.5–5.1)
PROT SERPL-MCNC: 5.8 G/DL (ref 6–8.4)
PROTHROMBIN TIME: 14.2 SEC (ref 11.4–13.7)
RBC # BLD AUTO: 4.96 M/UL (ref 4–5.4)
SODIUM SERPL-SCNC: 133 MMOL/L (ref 136–145)
TOXICOLOGY INFORMATION: ABNORMAL
WBC # BLD AUTO: 5.25 K/UL (ref 3.9–12.7)

## 2022-08-12 PROCEDURE — 63600175 PHARM REV CODE 636 W HCPCS: Performed by: INTERNAL MEDICINE

## 2022-08-12 PROCEDURE — 25000003 PHARM REV CODE 250: Performed by: NURSE PRACTITIONER

## 2022-08-12 PROCEDURE — 63600175 PHARM REV CODE 636 W HCPCS: Performed by: NURSE PRACTITIONER

## 2022-08-12 PROCEDURE — 99223 PR INITIAL HOSPITAL CARE,LEVL III: ICD-10-PCS | Mod: ,,, | Performed by: STUDENT IN AN ORGANIZED HEALTH CARE EDUCATION/TRAINING PROGRAM

## 2022-08-12 PROCEDURE — 12000002 HC ACUTE/MED SURGE SEMI-PRIVATE ROOM

## 2022-08-12 PROCEDURE — 86376 MICROSOMAL ANTIBODY EACH: CPT | Performed by: NURSE PRACTITIONER

## 2022-08-12 PROCEDURE — 96366 THER/PROPH/DIAG IV INF ADDON: CPT

## 2022-08-12 PROCEDURE — 96376 TX/PRO/DX INJ SAME DRUG ADON: CPT

## 2022-08-12 PROCEDURE — 99223 1ST HOSP IP/OBS HIGH 75: CPT | Mod: ,,, | Performed by: STUDENT IN AN ORGANIZED HEALTH CARE EDUCATION/TRAINING PROGRAM

## 2022-08-12 PROCEDURE — 25000003 PHARM REV CODE 250: Performed by: INTERNAL MEDICINE

## 2022-08-12 PROCEDURE — 96375 TX/PRO/DX INJ NEW DRUG ADDON: CPT

## 2022-08-12 PROCEDURE — 80053 COMPREHEN METABOLIC PANEL: CPT | Performed by: NURSE PRACTITIONER

## 2022-08-12 PROCEDURE — 86038 ANTINUCLEAR ANTIBODIES: CPT | Performed by: NURSE PRACTITIONER

## 2022-08-12 PROCEDURE — 36415 COLL VENOUS BLD VENIPUNCTURE: CPT | Performed by: NURSE PRACTITIONER

## 2022-08-12 PROCEDURE — 85610 PROTHROMBIN TIME: CPT | Performed by: NURSE PRACTITIONER

## 2022-08-12 PROCEDURE — 96367 TX/PROPH/DG ADDL SEQ IV INF: CPT

## 2022-08-12 PROCEDURE — 80143 DRUG ASSAY ACETAMINOPHEN: CPT | Performed by: NURSE PRACTITIONER

## 2022-08-12 PROCEDURE — 80307 DRUG TEST PRSMV CHEM ANLYZR: CPT | Performed by: NURSE PRACTITIONER

## 2022-08-12 PROCEDURE — 85025 COMPLETE CBC W/AUTO DIFF WBC: CPT | Performed by: NURSE PRACTITIONER

## 2022-08-12 PROCEDURE — 83516 IMMUNOASSAY NONANTIBODY: CPT | Performed by: NURSE PRACTITIONER

## 2022-08-12 PROCEDURE — G0378 HOSPITAL OBSERVATION PER HR: HCPCS

## 2022-08-12 RX ORDER — IBUPROFEN 200 MG
200 TABLET ORAL ONCE
Status: COMPLETED | OUTPATIENT
Start: 2022-08-12 | End: 2022-08-12

## 2022-08-12 RX ADMIN — PIPERACILLIN SODIUM AND TAZOBACTAM SODIUM 3.38 G: 3; .375 INJECTION, POWDER, LYOPHILIZED, FOR SOLUTION INTRAVENOUS at 10:08

## 2022-08-12 RX ADMIN — PROCHLORPERAZINE EDISYLATE 5 MG: 5 INJECTION INTRAMUSCULAR; INTRAVENOUS at 08:08

## 2022-08-12 RX ADMIN — PIPERACILLIN SODIUM AND TAZOBACTAM SODIUM 3.38 G: 3; .375 INJECTION, POWDER, LYOPHILIZED, FOR SOLUTION INTRAVENOUS at 05:08

## 2022-08-12 RX ADMIN — IBUPROFEN 200 MG: 200 TABLET, FILM COATED ORAL at 06:08

## 2022-08-12 RX ADMIN — ONDANSETRON 4 MG: 2 INJECTION INTRAMUSCULAR; INTRAVENOUS at 07:08

## 2022-08-12 RX ADMIN — ONDANSETRON 4 MG: 2 INJECTION INTRAMUSCULAR; INTRAVENOUS at 05:08

## 2022-08-12 RX ADMIN — ACETYLCYSTEINE 14600 MG: 200 INJECTION, SOLUTION INTRAVENOUS at 04:08

## 2022-08-12 NOTE — PLAN OF CARE
[facility]  Initial Discharge Assessment       Primary Care Provider: Primary Doctor No    Admission Diagnosis: Jaundice [R17]    Admission Date: 8/11/2022  Expected Discharge Date:     Discharge Barriers Identified: None     Initial assessment completed via telephone with patient. Patient does not have a PCP. CM referring patient to [facility] and will schedule follow up appointment. Patient anticipates discharge home when medically clear.    Payor: MEDICAID / Plan: [employer] [facility] / Product Type: Managed Medicaid /     Extended Emergency Contact Information  Primary Emergency Contact: Daron Calderón  Address: [address] [place] [address] [facility]  Home Phone: [phone]  Mobile Phone: [phone]  Relation: Spouse    Discharge Plan A: Home  Discharge Plan B: Home      [facility] [id number] - [place], [place] - [address]  [address] [address]  Phone: [phone] Fax: [phone]      Initial Assessment (most recent)     Adult Discharge Assessment - 08/12/22 1545        Discharge Assessment    Assessment Type Discharge Planning Assessment     Confirmed/corrected address, phone number and insurance Yes     Confirmed Demographics Correct on Facesheet     Source of Information patient     When was your last doctors appointment? --   2021    Does patient/caregiver understand observation status Yes     Reason For Admission Jaundice     Lives With spouse     Facility Arrived From: home     Do you expect to return to your current living situation? Yes     Who are your caregiver(s) and their phone number(s)? Daron (spouse) [phone]     Prior to hospitilization cognitive status: Alert/Oriented     Current cognitive status: Alert/Oriented     Walking or Climbing Stairs Difficulty none     Dressing/Bathing Difficulty none     Equipment Currently Used at Home none     Readmission within 30 days? No     Patient currently being followed by  outpatient case management? No     Do you currently have service(s) that help you manage your care at home? No     Do you take prescription medications? No     Do you have prescription coverage? No     Do you have any problems affording any of your prescribed medications? TBD     Who is going to help you get home at discharge? Daron (spouse) [phone]     How do you get to doctors appointments? car, drives self     Are you on dialysis? No     Do you take coumadin? No     Discharge Plan A Home     Discharge Plan B Home     DME Needed Upon Discharge  none     Discharge Plan discussed with: Patient     Discharge Barriers Identified None        Relationship/Environment    Name(s) of Who Lives With Patient Daron (spouse) [phone]

## 2022-08-12 NOTE — CONSULTS
[facility]  General Surgery  Consult Note    Consults  Subjective:     Chief Complaint/Reason for Admission:  Acute hepatitis    History of Present Illness:  This is a 47-year-old female who presented with worsening epigastric abdominal pain over the past couple of weeks and jaundice.  She had imaging in the ED that showed gallbladder wall thickening pericholecystic fluid without biliary duct dilation.  I was consulted for possible cholecystitis    No current facility-administered medications on file prior to encounter.     Current Outpatient Medications on File Prior to Encounter   Medication Sig    promethazine (PHENERGAN) 25 MG tablet Take 25 mg by mouth 3 (three) times daily as needed.    rimegepant (NURTEC) 75 mg odt Take 75 mg by mouth once as needed.    galcanezumab-gnlm (EMGALITY PEN) 120 mg/mL PnIj 120 mg.    ubrogepant (UBRELVY) 100 mg tablet Ubrelvy 100 mg tablet   1 po at ha onset. Repeat after 2 hours if needed. No more than 2/day       Review of patient's allergies indicates:  No Known Allergies    No past medical history on file.  No past surgical history on file.  Family History    None       Tobacco Use    Smoking status: Not on file    Smokeless tobacco: Not on file   Substance and Sexual Activity    Alcohol use: Not on file    Drug use: Not on file    Sexual activity: Not on file     Review of Systems   Constitutional: Negative for fever.   HENT: Negative.    Eyes: Negative.    Respiratory: Negative.    Cardiovascular: Negative.    Gastrointestinal: Positive for abdominal pain.   Endocrine: Negative.    Genitourinary: Negative.    Musculoskeletal: Negative.    Skin: Positive for color change.   Allergic/Immunologic: Negative.    Neurological: Negative.    Hematological: Negative.    Psychiatric/Behavioral: Negative.      Objective:     Vital Signs (Most Recent):  Temp: 97.8 °F (36.6 °C) (08/12/22 1705)  Pulse: 81 (08/12/22 1705)  Resp: 16 (08/12/22 1705)  BP: 135/88  (08/12/22 1705)  SpO2: 96 % (08/12/22 1705) Vital Signs (24h Range):  Temp:  [97.3 °F (36.3 °C)-98.9 °F (37.2 °C)] 97.8 °F (36.6 °C)  Pulse:  [67-96] 81  Resp:  [15-18] 16  SpO2:  [96 %-99 %] 96 %  BP: (123-160)/(66-88) 135/88     Weight: 97 kg (213 lb 13.5 oz)  Body mass index is 34.52 kg/m².    No intake or output data in the 24 hours ending 08/12/22 1831    Physical Exam  Constitutional:       General: She is not in acute distress.     Appearance: Normal appearance. She is not ill-appearing, toxic-appearing or diaphoretic.   HENT:      Head: Normocephalic.      Nose: Nose normal.   Eyes:      General: Scleral icterus present.      Conjunctiva/sclera: Conjunctivae normal.   Cardiovascular:      Rate and Rhythm: Normal rate and regular rhythm.   Pulmonary:      Effort: Pulmonary effort is normal.   Abdominal:      Tenderness: There is abdominal tenderness.   Musculoskeletal:         General: Normal range of motion.      Cervical back: Normal range of motion.   Skin:     General: Skin is warm.      Coloration: Skin is jaundiced.   Neurological:      General: No focal deficit present.      Mental Status: She is alert.   Psychiatric:         Mood and Affect: Mood normal.         Significant Labs:  CBC:   Recent Labs   Lab 08/12/22  0530   WBC 5.25   RBC 4.96   HGB 13.5   HCT 42.1      MCV 85   MCH 27.2   MCHC 32.1     CMP:   Recent Labs   Lab 08/12/22  0530   GLU 87   CALCIUM 8.4*   ALBUMIN 2.8*   PROT 5.8*   *   K 3.5   CO2 24      BUN 7   CREATININE 0.3*   ALKPHOS 90   ALT 1,331*   AST 1,931*   BILITOT 14.8*     Coagulation:   Recent Labs   Lab 08/12/22  0938   INR 1.2     Lactic Acid: No results for input(s): LACTATE in the last 48 hours.    Significant Diagnostics:  CT reviewed.  Gallbladder wall thickening and pericholecystic fluid.  This can be seen with acute hepatitis.    Ultrasound without gallstones    Assessment/Plan:     Active Diagnoses:    Diagnosis Date Noted POA    PRINCIPAL  PROBLEM:  Jaundice [R17] 08/11/2022 Yes    Hypokalemia [E87.6] 08/11/2022 Yes      Problems Resolved During this Admission:     47-year-old female who presented with worsening upper abdominal pain and jaundice.  Ultrasound of the gallbladder showed wall thickening and edema without gallstones.  No biliary dilation on imaging.  Significant elevation in LFTs    Patient has been hemodynamically stable which makes acalculous cholecystitis unlikely to be the diagnosis.  Without biliary dilation seen on imaging, it is unlikely that this is an obstructive process.  Given the severe hepatocellular dysfunction based off of labs, favor acute hepatitis.      No plans for surgical intervention at this point time.  I will sign off.  Please call with questions.    Thank you for your consult.     Keyur William MD  General Surgery  [facility]

## 2022-08-12 NOTE — CONSULTS
GASTROENTEROLOGY INPATIENT CONSULT NOTE  Patient Name: Kina Pat  Patient MRN: [id number]  Patient : 1975    Admit Date: 2022  Service date: 2022    Reason for Consult: elevated LFTs    PCP: Primary Doctor No    Chief Complaint   Patient presents with    Abdominal Pain    Jaundice       HPI: Patient is a 47 y.o. female with PMHx of migraines presented to the ER yesterday with epigastric abdominal pain and worsening jaundice over the past week. LFTs noted to be significantly elevated in ER and patient was admitted for work-up. Patient reports epigastric pain started 2-3 weeks ago and was associated with n/v and chills. The n/v and chills subsided and she noticed her skin yellowing last week. She denies any history of liver, gallbladder or pancreatic disease. She denies any sick contacts. She denies any change in medications but reports she takes several OTC supplements such as milk thistle, vitamin C, B vitamins, MVI, and zinc. She also admits to at least 2g of tylenol every day for several weeks. She denies any episodes of hypotension/dizziness or near syncope recently. She reports normal, daily BMs.     Past Medical History:  No past medical history on file.     Past Surgical History:  No past surgical history on file.     Home Medications:  Medications Prior to Admission   Medication Sig Dispense Refill Last Dose    promethazine (PHENERGAN) 25 MG tablet Take 25 mg by mouth 3 (three) times daily as needed.   Past Month at Unknown time    rimegepant (NURTEC) 75 mg odt Take 75 mg by mouth once as needed.   Past Month at Unknown time    galcanezumab-gnlm (EMGALITY PEN) 120 mg/mL PnIj 120 mg.       ubrogepant (UBRELVY) 100 mg tablet Ubrelvy 100 mg tablet   1 po at ha onset. Repeat after 2 hours if needed. No more than 2/day          Inpatient Medications:   piperacillin-tazobactam (ZOSYN) IVPB  3.375 g Intravenous Q8H    sodium chloride 0.9%  3 mL Intravenous Q8H     aluminum-magnesium  "hydroxide-simethicone, dextrose 50%, dextrose 50%, glucagon (human recombinant), glucose, glucose, melatonin, naloxone, ondansetron, oxyCODONE, prochlorperazine    Review of patient's allergies indicates:  No Known Allergies    Social History:   Social History     Occupational History    Not on file   Tobacco Use    Smoking status: Not on file    Smokeless tobacco: Not on file   Substance and Sexual Activity    Alcohol use: Not on file    Drug use: Not on file    Sexual activity: Not on file       Family History:   No family history on file.    Review of Systems:  A 10 point review of systems was performed and was normal, except as mentioned in the HPI, including constitutional, HEENT, heme, lymph, cardiovascular, respiratory, gastrointestinal, genitourinary, neurologic, endocrine, psychiatric and musculoskeletal.      OBJECTIVE:    Physical Exam:  24 Hour Vital Sign Ranges: Temp:  [97.3 °F (36.3 °C)-98.9 °F (37.2 °C)] 98.8 °F (37.1 °C)  Pulse:  [67-96] 79  Resp:  [15-18] 16  SpO2:  [96 %-100 %] 96 %  BP: (122-160)/(66-84) 125/78  Most recent vitals: /78   Pulse 79   Temp 98.8 °F (37.1 °C) (Oral)   Resp 16   Ht 5' 6" (1.676 m)   Wt 97 kg (213 lb 13.5 oz)   LMP  (LMP Unknown)   SpO2 96%   Breastfeeding No   BMI 34.52 kg/m²    GEN: well-developed, well-nourished, awake and alert, mildly uncomfortable  HEENT: PERRL,+ sclera icteric, oral mucosa pink and moist without lesion  NECK: trachea midline; Good ROM  CV: regular rate and rhythm, no murmurs or gallops  RESP: clear to auscultation bilaterally, no wheezes, rhonci or rales  ABD: soft, mildly distended, normal bowel sounds, +epigastric tenderness  EXT: no swelling or edema, 2+ pulses distally  SKIN: + jaundice  PSYCH: flat affect    Labs:   Recent Labs     08/11/22  1019 08/12/22  0530   WBC 5.95 5.25   MCV 85 85    319     Recent Labs     08/11/22  1019 08/12/22  0530   * 133*   K 3.4* 3.5    102   CO2 21* 24   BUN 8 7   GLU " 122* 87     No results for input(s): ALB in the last 72 hours.    Invalid input(s): ALKP, SGOT, SGPT, TBIL, DBIL, TPRO  Recent Labs     08/12/22  0938   INR 1.2         Radiology Review:  CT Abdomen Pelvis With Contrast   Final Result      US Abdomen Limited (Gallbladder)   Final Result            IMPRESSION / RECOMMENDATIONS:  Patient is a 47 y.o. female with PMHx of migraines presented to the ER yesterday with epigastric abdominal pain and worsening jaundice over the past week. LFTs noted to be significantly elevated in ER and patient was admitted for work-up.     Plan:  1. Acute hepatitis  -AST/ALT 1931/1331, bili 14.8, alb 2.8, platelets normal, PT/INR 14.2/1.2. Hepatocellular pattern of injury  -CT and u/s demonstrate normal liver parenchyma and no biliary dilation. The gallbladder is thickened which can be seen with hepatocellular disease. Trace ascites  -With this pattern of elevation suspect either viral etiology vs medication induced liver injury. Ischemia less likely with no signs.   -Admits to daily tylenol use for weeks with at least 2g/day: get acetaminophen level  -Viral hepatitis panel still pending? If negative consider CMV, HSV and EBV panels  -Supportive care for now; adequate protein intake is important for liver recovery. Start regular diet  -Get drug screen & work-up for autoimmune markers  -Monitor daily CMP, CBC and PT/INR    Thank you for this consult.    Lani Medellin  8/12/2022  12:49 PM

## 2022-08-13 LAB
ALBUMIN SERPL BCP-MCNC: 3 G/DL (ref 3.5–5.2)
ALP SERPL-CCNC: 96 U/L (ref 55–135)
ALT SERPL W/O P-5'-P-CCNC: 1410 U/L (ref 10–44)
ANA TITR SER IF: NEGATIVE {TITER}
ANION GAP SERPL CALC-SCNC: 10 MMOL/L (ref 8–16)
AST SERPL-CCNC: 1929 U/L (ref 10–40)
BASOPHILS # BLD AUTO: 0.05 K/UL (ref 0–0.2)
BASOPHILS NFR BLD: 0.8 % (ref 0–1.9)
BILIRUB SERPL-MCNC: 18 MG/DL (ref 0.1–1)
BUN SERPL-MCNC: 7 MG/DL (ref 6–20)
CALCIUM SERPL-MCNC: 8.5 MG/DL (ref 8.7–10.5)
CHLORIDE SERPL-SCNC: 101 MMOL/L (ref 95–110)
CO2 SERPL-SCNC: 24 MMOL/L (ref 23–29)
CREAT SERPL-MCNC: 0.4 MG/DL (ref 0.5–1.4)
DIFFERENTIAL METHOD: ABNORMAL
EOSINOPHIL # BLD AUTO: 0.1 K/UL (ref 0–0.5)
EOSINOPHIL NFR BLD: 1.1 % (ref 0–8)
ERYTHROCYTE [DISTWIDTH] IN BLOOD BY AUTOMATED COUNT: 19.9 % (ref 11.5–14.5)
EST. GFR  (NO RACE VARIABLE): >60 ML/MIN/1.73 M^2
GLUCOSE SERPL-MCNC: 101 MG/DL (ref 70–110)
HCT VFR BLD AUTO: 45.2 % (ref 37–48.5)
HGB BLD-MCNC: 14.7 G/DL (ref 12–16)
IMM GRANULOCYTES # BLD AUTO: 0.02 K/UL (ref 0–0.04)
IMM GRANULOCYTES NFR BLD AUTO: 0.3 % (ref 0–0.5)
LYMPHOCYTES # BLD AUTO: 1.3 K/UL (ref 1–4.8)
LYMPHOCYTES NFR BLD: 19.7 % (ref 18–48)
MCH RBC QN AUTO: 27.5 PG (ref 27–31)
MCHC RBC AUTO-ENTMCNC: 32.5 G/DL (ref 32–36)
MCV RBC AUTO: 85 FL (ref 82–98)
MONOCYTES # BLD AUTO: 0.7 K/UL (ref 0.3–1)
MONOCYTES NFR BLD: 10.9 % (ref 4–15)
NEUTROPHILS # BLD AUTO: 4.3 K/UL (ref 1.8–7.7)
NEUTROPHILS NFR BLD: 67.2 % (ref 38–73)
NRBC BLD-RTO: 0 /100 WBC
PLATELET # BLD AUTO: 393 K/UL (ref 150–450)
PMV BLD AUTO: 9.3 FL (ref 9.2–12.9)
POTASSIUM SERPL-SCNC: 3.6 MMOL/L (ref 3.5–5.1)
PROT SERPL-MCNC: 6.4 G/DL (ref 6–8.4)
RBC # BLD AUTO: 5.35 M/UL (ref 4–5.4)
SODIUM SERPL-SCNC: 135 MMOL/L (ref 136–145)
WBC # BLD AUTO: 6.34 K/UL (ref 3.9–12.7)

## 2022-08-13 PROCEDURE — 80053 COMPREHEN METABOLIC PANEL: CPT | Performed by: NURSE PRACTITIONER

## 2022-08-13 PROCEDURE — A4216 STERILE WATER/SALINE, 10 ML: HCPCS | Performed by: NURSE PRACTITIONER

## 2022-08-13 PROCEDURE — 63600175 PHARM REV CODE 636 W HCPCS: Performed by: INTERNAL MEDICINE

## 2022-08-13 PROCEDURE — 12000002 HC ACUTE/MED SURGE SEMI-PRIVATE ROOM

## 2022-08-13 PROCEDURE — 25000003 PHARM REV CODE 250: Performed by: NURSE PRACTITIONER

## 2022-08-13 PROCEDURE — 36415 COLL VENOUS BLD VENIPUNCTURE: CPT | Performed by: NURSE PRACTITIONER

## 2022-08-13 PROCEDURE — 85025 COMPLETE CBC W/AUTO DIFF WBC: CPT | Performed by: NURSE PRACTITIONER

## 2022-08-13 RX ADMIN — SODIUM CHLORIDE, PRESERVATIVE FREE 3 ML: 5 INJECTION INTRAVENOUS at 02:08

## 2022-08-13 RX ADMIN — PIPERACILLIN SODIUM AND TAZOBACTAM SODIUM 3.38 G: 3; .375 INJECTION, POWDER, LYOPHILIZED, FOR SOLUTION INTRAVENOUS at 05:08

## 2022-08-13 NOTE — PROGRESS NOTES
[facility] Medicine  Progress Note    Patient name: Kina Pat  MRN: [id number]  Admit Date: 8/11/2022   LOS: 1 day     SUBJECTIVE:     Principal problem: Jaundice    Interval History:  Symptoms unchanged though the nausea that she was experiencing yesterday is less. Bilirubin has increased to 18.  LFTs remain the same. She is understandably frustrated at the lack of diagnosis and thus lack of treatment plan and time line. We went over results and various clinical scenarios.     Hospital Course:    Patient presented to the ED with abdominal pain for the past few weeks with jaundice for the  Past 3-4 days.  She reports tylenol use- level checked and normal. Admission labs significant for bilirubin of 15, AST 1733, ALT 1319. Lipase was normal.  Alk phosph was normal. INR 1.2.  US in the ED with pronounced gallbladder thickening but normal CBD diameter. No intrahepatic biliary ductal dilatation seen.  CT abd and pelvis ordered and revealed diffuse wall thickening of the gallbladder with pericholecystic edema and a small enhancing nodule anterior to the gallbladder that could be a prominent node. She was admitted and GI and General surgery consulted.  I started Zosyn given concern for acute cholecystitis. Seen by GI who started NAC while awaiting tylenol level.  Discussed with GI after normal tylenol level revealed and preferred to continue it for now but it has made patient nauseated and she has declined it.  8/13 RN reports declining Zosyn as well as also feels it is making her nauseated. Labs for viral hepatitis, autoimmune disorders, etc, sent but these are send outs.  Seen by General Surgery and felt that without biliary dilatation, it is unlikely an obstructive process and it is felt that more than likely represents acute hepatitis as the etiology of her thickened and edematous gallbladder.It is also felt that she does not have acalculous cholecystitis given her hemodynamic stability.      Scheduled Meds:   acetylcysteine  50 mg/kg Intravenous Once    acetylcysteine  100 mg/kg Intravenous Once    piperacillin-tazobactam (ZOSYN) IVPB  3.375 g Intravenous Q8H    sodium chloride 0.9%  3 mL Intravenous Q8H     Continuous Infusions:   sodium chloride 0.9% 100 mL/hr at 08/11/22 1450     PRN Meds:aluminum-magnesium hydroxide-simethicone, dextrose 50%, dextrose 50%, glucagon (human recombinant), glucose, glucose, melatonin, naloxone, ondansetron, oxyCODONE, prochlorperazine    Review of patient's allergies indicates:  No Known Allergies    Review of Systems: As per interval history    OBJECTIVE:     Vital Signs (Most Recent)  Temp: 98.1 °F (36.7 °C) (08/13/22 1659)  Pulse: 85 (08/13/22 1659)  Resp: 17 (08/13/22 1659)  BP: 133/78 (08/13/22 1659)  SpO2: 98 % (08/13/22 1659)    Vital Signs Range (Last 24H):  Temp:  [98.1 °F (36.7 °C)-98.6 °F (37 °C)]   Pulse:  []   Resp:  [17-18]   BP: (122-138)/(78-89)   SpO2:  [97 %-98 %]     I & O (Last 24H):  No intake or output data in the 24 hours ending 08/13/22 1831    Physical Exam:    Vitals and nursing note reviewed.     Constitutional:       General: Not in acute distress.       Appearance: Well-developed.   HENT:      Head: Normocephalic and atraumatic.   Eyes:      Pupils: Pupils are equal, round, and reactive to light.   Scleral icterus.  Cardiovascular:      Rate and Rhythm: Regular rhythm.   Pulmonary:      Effort: Pulmonary effort is normal.      Breath sounds: Normal breath sounds. No wheezing.   Abdominal:      General: There is no distension.      Palpations: Abdomen is soft.      Tenderness: There is no abdominal tenderness. There is no guarding or rebound.   Musculoskeletal:         General: Normal range of motion.      Cervical back: Normal range of motion.   Skin:     Findings: No rash.  Jaundiced.  Neurological:      Mental Status: Alert and oriented to person, place, and time.      Cranial Nerves: No cranial nerve deficit.      Sensory:  No sensory deficit.     Laboratory:  I have reviewed all pertinent lab results within the past 24 hours.  CBC:   Recent Labs   Lab 08/13/22  0552   WBC 6.34   RBC 5.35   HGB 14.7   HCT 45.2      MCV 85   MCH 27.5   MCHC 32.5     CMP:   Recent Labs   Lab 08/13/22  0552      CALCIUM 8.5*   ALBUMIN 3.0*   PROT 6.4   *   K 3.6   CO2 24      BUN 7   CREATININE 0.4*   ALKPHOS 96   ALT 1,410*   AST 1,929*   BILITOT 18.0*       Diagnostic Results:      ASSESSMENT/PLAN:         Active Hospital Problems    Diagnosis  POA    *Jaundice [R17]  Yes    Hyponatremia [E87.1]  Yes    Hepatitis [K75.9]  Yes    Hypokalemia [E87.6]  Yes     K+3.4. Replenish  Monitor labs         Resolved Hospital Problems   No resolved problems to display.         Plan:     -GI consulted given transaminitis and hyperbilirubinemia.  Hepatitis favored but etiology unknown.  Labs sent.  Tylenol level checked and normal.   -IV NAC started but has made patient nauseated and thus she has declined further.  -Distended gallbladder on imaging with wall edema. No biliary dilatation.  General Surgery consulted and does not feel this is acute acalculous cholecystitis.  Empiric antibiotics with zosyn.  Patient feels that Zosyn has made her nauseated per RN and thus also declined.   -Replacing electrolytes.   -Trending hepatic function  -IVFs  --prn IV antiemetic      VTE Risk Mitigation (From admission, onward)         Ordered     IP VTE HIGH RISK PATIENT  Once         08/11/22 2147     Place sequential compression device  Until discontinued         08/11/22 2147                  Department Hospital Medicine  [facility]  Bailey Love MD  Date of service: 08/13/2022

## 2022-08-13 NOTE — PROGRESS NOTES
[facility] Medicine  Progress Note    Patient name: Kina Pat  MRN: [id number]  Admit Date: 8/11/2022   LOS: 0 days     SUBJECTIVE:     Principal problem: Jaundice    Interval History:  Patient is quite nauseated at the time of my exam.  She had just received IV NAC.  History limited due to her nausea.  Discussed lab findings and the opinion of GI.  Tylenol level checked given frequent use and was normal.     Hospital Course:    Scheduled Meds:   acetylcysteine  50 mg/kg Intravenous Once    acetylcysteine  100 mg/kg Intravenous Once    piperacillin-tazobactam (ZOSYN) IVPB  3.375 g Intravenous Q8H    sodium chloride 0.9%  3 mL Intravenous Q8H     Continuous Infusions:   sodium chloride 0.9% 100 mL/hr at 08/11/22 1450     PRN Meds:aluminum-magnesium hydroxide-simethicone, dextrose 50%, dextrose 50%, glucagon (human recombinant), glucose, glucose, melatonin, naloxone, ondansetron, oxyCODONE, prochlorperazine    Review of patient's allergies indicates:  No Known Allergies    Review of Systems: As per interval history    OBJECTIVE:     Vital Signs (Most Recent)  Temp: 98.2 °F (36.8 °C) (08/12/22 1935)  Pulse: (!) 111 (08/12/22 1935)  Resp: 18 (08/12/22 1935)  BP: 133/83 (08/12/22 1935)  SpO2: 97 % (08/12/22 1935)    Vital Signs Range (Last 24H):  Temp:  [97.3 °F (36.3 °C)-98.8 °F (37.1 °C)]   Pulse:  []   Resp:  [16-18]   BP: (125-160)/(78-88)   SpO2:  [96 %-98 %]     I & O (Last 24H):    Intake/Output Summary (Last 24 hours) at 8/12/2022 2231  Last data filed at 8/12/2022 1722  Gross per 24 hour   Intake 360 ml   Output --   Net 360 ml       Physical Exam:    Vitals and nursing note reviewed.     Constitutional:       General: Not in acute distress.  Nauseated and uncomfortable.      Appearance: Well-developed.   HENT:      Head: Normocephalic and atraumatic.   Eyes:      Pupils: Pupils are equal, round, and reactive to light.   Scleral icterus.  Cardiovascular:      Rate and  Rhythm: Regular rhythm.   Pulmonary:      Effort: Pulmonary effort is normal.      Breath sounds: Normal breath sounds. No wheezing.   Abdominal:      General: There is no distension.      Palpations: Abdomen is soft.      Tenderness: There is no abdominal tenderness. There is no guarding or rebound.   Musculoskeletal:         General: Normal range of motion.      Cervical back: Normal range of motion.   Skin:     Findings: No rash.  Jaundiced.  Neurological:      Mental Status: Alert and oriented to person, place, and time.      Cranial Nerves: No cranial nerve deficit.      Sensory: No sensory deficit.     Laboratory:  I have reviewed all pertinent lab results within the past 24 hours.  CBC:   Recent Labs   Lab 08/12/22  0530   WBC 5.25   RBC 4.96   HGB 13.5   HCT 42.1      MCV 85   MCH 27.2   MCHC 32.1     CMP:   Recent Labs   Lab 08/12/22  0530   GLU 87   CALCIUM 8.4*   ALBUMIN 2.8*   PROT 5.8*   *   K 3.5   CO2 24      BUN 7   CREATININE 0.3*   ALKPHOS 90   ALT 1,331*   AST 1,931*   BILITOT 14.8*       Diagnostic Results:      ASSESSMENT/PLAN:         Active Hospital Problems    Diagnosis  POA    *Jaundice [R17]  Yes    Hyponatremia [E87.1]  Yes    Hepatitis [K75.9]  Yes    Hypokalemia [E87.6]  Yes     K+3.4. Replenish  Monitor labs         Resolved Hospital Problems   No resolved problems to display.         Plan:     -GI consulted given transaminitis and hyperbilirubinemia.  Hepatitis favored but etiology unknown.  Labs sent.  Tylenol level checked and normal.   -IV NAC started  -Distended gallbladder on imaging with wall edema. No biliary dilatation.  General Surgery consulted and does not feel this is acute acalculous cholecystitis.  Empiric antibiotics with zosyn.  -Replacing electrolytes.   -Trending hepatic function  -IVFs  --prn IV antiemetic      VTE Risk Mitigation (From admission, onward)         Ordered     IP VTE HIGH RISK PATIENT  Once         08/11/22 2147     Place  sequential compression device  Until discontinued         08/11/22 2147                  Department Hospital Medicine  [facility]  Bailey Love MD  Date of service: 08/12/2022

## 2022-08-14 LAB
ALBUMIN SERPL BCP-MCNC: 2.7 G/DL (ref 3.5–5.2)
ALP SERPL-CCNC: 87 U/L (ref 55–135)
ALT SERPL W/O P-5'-P-CCNC: 1266 U/L (ref 10–44)
ANION GAP SERPL CALC-SCNC: 10 MMOL/L (ref 8–16)
AST SERPL-CCNC: 1652 U/L (ref 10–40)
BACTERIA UR CULT: NO GROWTH
BASOPHILS # BLD AUTO: 0.06 K/UL (ref 0–0.2)
BASOPHILS NFR BLD: 1.1 % (ref 0–1.9)
BILIRUB SERPL-MCNC: 16.2 MG/DL (ref 0.1–1)
BUN SERPL-MCNC: 5 MG/DL (ref 6–20)
CALCIUM SERPL-MCNC: 8.4 MG/DL (ref 8.7–10.5)
CHLORIDE SERPL-SCNC: 106 MMOL/L (ref 95–110)
CO2 SERPL-SCNC: 24 MMOL/L (ref 23–29)
CREAT SERPL-MCNC: 0.5 MG/DL (ref 0.5–1.4)
DIFFERENTIAL METHOD: ABNORMAL
EOSINOPHIL # BLD AUTO: 0.2 K/UL (ref 0–0.5)
EOSINOPHIL NFR BLD: 3 % (ref 0–8)
ERYTHROCYTE [DISTWIDTH] IN BLOOD BY AUTOMATED COUNT: 20 % (ref 11.5–14.5)
EST. GFR  (NO RACE VARIABLE): >60 ML/MIN/1.73 M^2
GLUCOSE SERPL-MCNC: 96 MG/DL (ref 70–110)
HCT VFR BLD AUTO: 42.3 % (ref 37–48.5)
HGB BLD-MCNC: 13.7 G/DL (ref 12–16)
IMM GRANULOCYTES # BLD AUTO: 0.02 K/UL (ref 0–0.04)
IMM GRANULOCYTES NFR BLD AUTO: 0.4 % (ref 0–0.5)
INR PPP: 1.2
LYMPHOCYTES # BLD AUTO: 1.2 K/UL (ref 1–4.8)
LYMPHOCYTES NFR BLD: 21.3 % (ref 18–48)
MCH RBC QN AUTO: 27.5 PG (ref 27–31)
MCHC RBC AUTO-ENTMCNC: 32.4 G/DL (ref 32–36)
MCV RBC AUTO: 85 FL (ref 82–98)
MONOCYTES # BLD AUTO: 0.8 K/UL (ref 0.3–1)
MONOCYTES NFR BLD: 14.1 % (ref 4–15)
NEUTROPHILS # BLD AUTO: 3.3 K/UL (ref 1.8–7.7)
NEUTROPHILS NFR BLD: 60.1 % (ref 38–73)
NRBC BLD-RTO: 0 /100 WBC
PLATELET # BLD AUTO: 341 K/UL (ref 150–450)
PMV BLD AUTO: 9.4 FL (ref 9.2–12.9)
POTASSIUM SERPL-SCNC: 3.2 MMOL/L (ref 3.5–5.1)
PROT SERPL-MCNC: 5.5 G/DL (ref 6–8.4)
PROTHROMBIN TIME: 14.2 SEC (ref 11.4–13.7)
RBC # BLD AUTO: 4.98 M/UL (ref 4–5.4)
SODIUM SERPL-SCNC: 140 MMOL/L (ref 136–145)
WBC # BLD AUTO: 5.4 K/UL (ref 3.9–12.7)

## 2022-08-14 PROCEDURE — 25000003 PHARM REV CODE 250: Performed by: INTERNAL MEDICINE

## 2022-08-14 PROCEDURE — 63600175 PHARM REV CODE 636 W HCPCS: Performed by: NURSE PRACTITIONER

## 2022-08-14 PROCEDURE — 85610 PROTHROMBIN TIME: CPT | Performed by: INTERNAL MEDICINE

## 2022-08-14 PROCEDURE — 25000003 PHARM REV CODE 250: Performed by: NURSE PRACTITIONER

## 2022-08-14 PROCEDURE — 12000002 HC ACUTE/MED SURGE SEMI-PRIVATE ROOM

## 2022-08-14 PROCEDURE — 85025 COMPLETE CBC W/AUTO DIFF WBC: CPT | Performed by: NURSE PRACTITIONER

## 2022-08-14 PROCEDURE — 80053 COMPREHEN METABOLIC PANEL: CPT | Performed by: NURSE PRACTITIONER

## 2022-08-14 PROCEDURE — A4216 STERILE WATER/SALINE, 10 ML: HCPCS | Performed by: NURSE PRACTITIONER

## 2022-08-14 PROCEDURE — 36415 COLL VENOUS BLD VENIPUNCTURE: CPT | Performed by: INTERNAL MEDICINE

## 2022-08-14 PROCEDURE — 80074 ACUTE HEPATITIS PANEL: CPT | Performed by: EMERGENCY MEDICINE

## 2022-08-14 PROCEDURE — 63600175 PHARM REV CODE 636 W HCPCS: Performed by: INTERNAL MEDICINE

## 2022-08-14 RX ADMIN — SODIUM CHLORIDE, PRESERVATIVE FREE 3 ML: 5 INJECTION INTRAVENOUS at 10:08

## 2022-08-14 RX ADMIN — PIPERACILLIN SODIUM AND TAZOBACTAM SODIUM 3.38 G: 3; .375 INJECTION, POWDER, LYOPHILIZED, FOR SOLUTION INTRAVENOUS at 01:08

## 2022-08-14 RX ADMIN — OXYCODONE HYDROCHLORIDE 5 MG: 5 TABLET ORAL at 09:08

## 2022-08-14 RX ADMIN — PIPERACILLIN SODIUM AND TAZOBACTAM SODIUM 3.38 G: 3; .375 INJECTION, POWDER, LYOPHILIZED, FOR SOLUTION INTRAVENOUS at 06:08

## 2022-08-14 RX ADMIN — ONDANSETRON 4 MG: 2 INJECTION INTRAMUSCULAR; INTRAVENOUS at 07:08

## 2022-08-14 RX ADMIN — SODIUM CHLORIDE, PRESERVATIVE FREE 3 ML: 5 INJECTION INTRAVENOUS at 02:08

## 2022-08-14 RX ADMIN — PIPERACILLIN SODIUM AND TAZOBACTAM SODIUM 3.38 G: 3; .375 INJECTION, POWDER, LYOPHILIZED, FOR SOLUTION INTRAVENOUS at 08:08

## 2022-08-14 RX ADMIN — OXYCODONE HYDROCHLORIDE 5 MG: 5 TABLET ORAL at 01:08

## 2022-08-14 NOTE — PROGRESS NOTES
GASTROENTEROLOGY INPATIENT CONSULT NOTE  Patient Name: Kina Pat  Patient MRN: [id number]  Patient : 1975    Admit Date: 2022  Service date: 2022    Reason for Consult: elevated LFTs    PCP: [facility]    Chief Complaint   Patient presents with    Abdominal Pain    Jaundice       HPI: Patient is a 47 y.o. female with PMHx of migraines presented to the ER yesterday with epigastric abdominal pain and worsening jaundice over the past week. LFTs noted to be significantly elevated in ER and patient was admitted for work-up. Patient reports epigastric pain started 2-3 weeks ago and was associated with n/v and chills. The n/v and chills subsided and she noticed her skin yellowing last week. She denies any history of liver, gallbladder or pancreatic disease. She denies any sick contacts. She denies any change in medications but reports she takes several OTC supplements such as milk thistle, vitamin C, B vitamins, MVI, and zinc. She also admits to at least 2g of tylenol every day for several weeks. She denies any episodes of hypotension/dizziness or near syncope recently. She reports normal, daily BMs.     Interval History  Doing OK today. No appetite but not nauseated. Had to stop NAC as developed severe nausea and not willing to re-try.    Past Medical History:  No past medical history on file.     Past Surgical History:  No past surgical history on file.     Home Medications:  Medications Prior to Admission   Medication Sig Dispense Refill Last Dose    promethazine (PHENERGAN) 25 MG tablet Take 25 mg by mouth 3 (three) times daily as needed.   Past Month at Unknown time    rimegepant (NURTEC) 75 mg odt Take 75 mg by mouth once as needed.   Past Month at Unknown time    galcanezumab-gnlm (EMGALITY PEN) 120 mg/mL PnIj 120 mg.       ubrogepant (UBRELVY) 100 mg tablet Ubrelvy 100 mg tablet   1 po at ha onset. Repeat after 2 hours if needed. No more than 2/day          Inpatient  "Medications:   acetylcysteine  50 mg/kg Intravenous Once    acetylcysteine  100 mg/kg Intravenous Once    piperacillin-tazobactam (ZOSYN) IVPB  3.375 g Intravenous Q8H    sodium chloride 0.9%  3 mL Intravenous Q8H     aluminum-magnesium hydroxide-simethicone, dextrose 50%, dextrose 50%, glucagon (human recombinant), glucose, glucose, melatonin, naloxone, ondansetron, oxyCODONE, prochlorperazine    Review of patient's allergies indicates:  No Known Allergies    Social History:   Social History     Occupational History    Not on file   Tobacco Use    Smoking status: Not on file    Smokeless tobacco: Not on file   Substance and Sexual Activity    Alcohol use: Not on file    Drug use: Not on file    Sexual activity: Not on file       Family History:   No family history on file.    Review of Systems:  A 10 point review of systems was performed and was normal, except as mentioned in the HPI, including constitutional, HEENT, heme, lymph, cardiovascular, respiratory, gastrointestinal, genitourinary, neurologic, endocrine, psychiatric and musculoskeletal.      OBJECTIVE:    Physical Exam:  24 Hour Vital Sign Ranges: Temp:  [98 °F (36.7 °C)-98.9 °F (37.2 °C)] 98.2 °F (36.8 °C)  Pulse:  [72-98] 72  Resp:  [18-20] 18  SpO2:  [97 %-98 %] 97 %  BP: (118-129)/(71-88) 124/81  Most recent vitals: /81   Pulse 72   Temp 98.2 °F (36.8 °C) (Oral)   Resp 18   Ht 5' 6" (1.676 m)   Wt 97 kg (213 lb 13.5 oz)   LMP  (LMP Unknown)   SpO2 97%   Breastfeeding No   BMI 34.52 kg/m²    GEN: well-developed, well-nourished, awake and alert, mildly uncomfortable  HEENT: PERRL,+ sclera icteric, oral mucosa pink and moist without lesion  NECK: trachea midline; Good ROM  CV: regular rate and rhythm, no murmurs or gallops  RESP: clear to auscultation bilaterally, no wheezes, rhonci or rales  ABD: soft, mildly distended, normal bowel sounds, +epigastric tenderness  EXT: no swelling or edema, 2+ pulses distally  SKIN: + " jaundice  PSYCH: flat affect    Labs:   Recent Labs     08/12/22  0530 08/13/22  0552 08/14/22  0546   WBC 5.25 6.34 5.40   MCV 85 85 85    393 341     Recent Labs     08/12/22  0530 08/13/22  0552 08/14/22  0546   * 135* 140   K 3.5 3.6 3.2*    101 106   CO2 24 24 24   BUN 7 7 5*   GLU 87 101 96     No results for input(s): ALB in the last 72 hours.    Invalid input(s): ALKP, SGOT, SGPT, TBIL, DBIL, TPRO  Recent Labs     08/12/22  0938 08/14/22  0546   INR 1.2 1.2         Radiology Review:  CT Abdomen Pelvis With Contrast   Final Result      US Abdomen Limited (Gallbladder)   Final Result            IMPRESSION / RECOMMENDATIONS:  Patient is a 47 y.o. female with PMHx of migraines presented to the ER yesterday with epigastric abdominal pain and worsening jaundice over the past week. LFTs noted to be significantly elevated in ER and patient was admitted for work-up.     Plan:  1. Acute hepatitis  -Initially, AST/ALT 1931/1331, bili 14.8, alb 2.8, platelets normal, PT/INR 14.2/1.2. Hepatocellular pattern of injury  -CT and u/s demonstrate normal liver parenchyma and no biliary dilation. The gallbladder is thickened which can be seen with hepatocellular disease. Trace ascites  -With this pattern of elevation suspect either viral etiology vs medication induced liver injury. Ischemia less likely with no signs.   -Admits to daily tylenol use for weeks with at least 2g/day: get acetaminophen level  - LFTs slightly improved. INR stable and close to normal which is reassuring  -work up pending. Apparently sample was lost as needed to be recollected today  - unable to tolerate NAC due to nausea  -Monitor daily CMP, CBC and PT/INR    Thank you for this consult.    Alberto Charles  8/14/2022  12:49 PM

## 2022-08-14 NOTE — PROGRESS NOTES
[facility] Medicine  Progress Note    Patient name: Kina Pat  MRN: [id number]  Admit Date: 8/11/2022   LOS: 2 days     SUBJECTIVE:     Principal problem: Jaundice    Interval History:  Denies current abdominal pain. No present nausea.  Thankfully, bilirubin is improving today.  LFTs slightly improved.  JOSE returned as negative.  I called lab to check on results of acute hepatitis panel and unfortunately it does not appear that lab received the sample 8/11.  Lab kindly stat manuel a different sample- expect results Tuesday/wednesday as this is a send out.  Discussed with GI given persistent elevation in numbers and after evaluation, they have recommended to continue present course while trending and waiting for send out test results.     Hospital Course:    Patient presented to the ED with abdominal pain for the past few weeks with jaundice for the  Past 3-4 days.  She reports tylenol use- level checked and normal. Admission labs significant for bilirubin of 15, AST 1733, ALT 1319. Lipase was normal.  Alk phosph was normal. INR 1.2.  US in the ED with pronounced gallbladder thickening but normal CBD diameter. No intrahepatic biliary ductal dilatation seen.  CT abd and pelvis ordered and revealed diffuse wall thickening of the gallbladder with pericholecystic edema and a small enhancing nodule anterior to the gallbladder that could be a prominent node. She was admitted and GI and General surgery consulted.  I started Zosyn given concern for acute cholecystitis. Seen by GI who started NAC while awaiting tylenol level.  Discussed with GI after normal tylenol level revealed and preferred to continue it for now but it has made patient nauseated and she has declined it.  8/13 RN reports declining Zosyn as well as also feels it is making her nauseated. Labs for viral hepatitis, autoimmune disorders, etc, sent but these are send outs.  Seen by General Surgery and felt that without biliary  dilatation, it is unlikely an obstructive process and it is felt that more than likely represents acute hepatitis as the etiology of her thickened and edematous gallbladder.It is also felt that she does not have acalculous cholecystitis given her hemodynamic stability.     Scheduled Meds:   acetylcysteine  50 mg/kg Intravenous Once    acetylcysteine  100 mg/kg Intravenous Once    piperacillin-tazobactam (ZOSYN) IVPB  3.375 g Intravenous Q8H    sodium chloride 0.9%  3 mL Intravenous Q8H     Continuous Infusions:   sodium chloride 0.9% 100 mL/hr at 08/11/22 1450     PRN Meds:aluminum-magnesium hydroxide-simethicone, dextrose 50%, dextrose 50%, glucagon (human recombinant), glucose, glucose, melatonin, naloxone, ondansetron, oxyCODONE, prochlorperazine    Review of patient's allergies indicates:  No Known Allergies    Review of Systems: As per interval history    OBJECTIVE:     Vital Signs (Most Recent)  Temp: 98.5 °F (36.9 °C) (08/14/22 1211)  Pulse: 73 (08/14/22 1211)  Resp: 18 (08/14/22 1310)  BP: 123/86 (08/14/22 1211)  SpO2: 98 % (08/14/22 1211)    Vital Signs Range (Last 24H):  Temp:  [98 °F (36.7 °C)-98.9 °F (37.2 °C)]   Pulse:  [73-98]   Resp:  [17-20]   BP: (118-133)/(71-88)   SpO2:  [97 %-98 %]     I & O (Last 24H):    Intake/Output Summary (Last 24 hours) at 8/14/2022 1416  Last data filed at 8/14/2022 0554  Gross per 24 hour   Intake 2350 ml   Output --   Net 2350 ml       Physical Exam:    Vitals and nursing note reviewed.     Constitutional:       General: Not in acute distress.       Appearance: Well-developed.   HENT:      Head: Normocephalic and atraumatic.   Eyes:      Pupils: Pupils are equal, round, and reactive to light.   Scleral icterus.  Cardiovascular:      Rate and Rhythm: Regular rhythm.   Pulmonary:      Effort: Pulmonary effort is normal.      Breath sounds: Normal breath sounds. No wheezing.   Abdominal:      General: There is no distension.      Palpations: Abdomen is soft.       Tenderness: There is no abdominal tenderness. There is no guarding or rebound.   Musculoskeletal:         General: Normal range of motion.      Cervical back: Normal range of motion.   Skin:     Findings: No rash.  Jaundiced.  Neurological:      Mental Status: Alert and oriented to person, place, and time.      Cranial Nerves: No cranial nerve deficit.      Sensory: No sensory deficit.     Laboratory:  I have reviewed all pertinent lab results within the past 24 hours.  CBC:   Recent Labs   Lab 08/14/22  0546   WBC 5.40   RBC 4.98   HGB 13.7   HCT 42.3      MCV 85   MCH 27.5   MCHC 32.4     CMP:   Recent Labs   Lab 08/14/22  0546   GLU 96   CALCIUM 8.4*   ALBUMIN 2.7*   PROT 5.5*      K 3.2*   CO2 24      BUN 5*   CREATININE 0.5   ALKPHOS 87   ALT 1,266*   AST 1,652*   BILITOT 16.2*       Diagnostic Results:      ASSESSMENT/PLAN:         Active Hospital Problems    Diagnosis  POA    *Jaundice [R17]  Yes    Hyponatremia [E87.1]  Yes    Hepatitis [K75.9]  Yes    Hypokalemia [E87.6]  Yes     K+3.4. Replenish  Monitor labs         Resolved Hospital Problems   No resolved problems to display.         Plan:     -GI consulted given transaminitis and hyperbilirubinemia.  Hepatitis favored but etiology unknown.  Labs sent.  Tylenol level checked and normal.   -IV NAC started but has made patient nauseated and thus she has declined further.  -Distended gallbladder on imaging with wall edema. No biliary dilatation.  General Surgery consulted and does not feel this is acute acalculous cholecystitis.  Empiric antibiotics with zosyn.  Will stop tomorrow if labs continue to improve and no signs of systemic infection. Checking procal tomorrow..   -Replacing electrolytes.   -Trending hepatic function  -IVFs  --prn IV antiemetic      VTE Risk Mitigation (From admission, onward)         Ordered     IP VTE HIGH RISK PATIENT  Once         08/11/22 2146     Place sequential compression device  Until discontinued          08/11/22 2147                  Department Hospital Medicine  [facility]  Bailey Love MD  Date of service: 08/14/2022

## 2022-08-15 LAB
ALBUMIN SERPL BCP-MCNC: 3.1 G/DL (ref 3.5–5.2)
ALP SERPL-CCNC: 94 U/L (ref 55–135)
ALT SERPL W/O P-5'-P-CCNC: 1205 U/L (ref 10–44)
ANION GAP SERPL CALC-SCNC: 11 MMOL/L (ref 8–16)
AST SERPL-CCNC: 1483 U/L (ref 10–40)
BASOPHILS # BLD AUTO: 0.08 K/UL (ref 0–0.2)
BASOPHILS NFR BLD: 1.5 % (ref 0–1.9)
BILIRUB SERPL-MCNC: 20.3 MG/DL (ref 0.1–1)
BUN SERPL-MCNC: 6 MG/DL (ref 6–20)
CALCIUM SERPL-MCNC: 9 MG/DL (ref 8.7–10.5)
CHLORIDE SERPL-SCNC: 105 MMOL/L (ref 95–110)
CO2 SERPL-SCNC: 22 MMOL/L (ref 23–29)
CREAT SERPL-MCNC: 0.4 MG/DL (ref 0.5–1.4)
DIFFERENTIAL METHOD: ABNORMAL
EOSINOPHIL # BLD AUTO: 0.2 K/UL (ref 0–0.5)
EOSINOPHIL NFR BLD: 3.9 % (ref 0–8)
ERYTHROCYTE [DISTWIDTH] IN BLOOD BY AUTOMATED COUNT: 20.8 % (ref 11.5–14.5)
EST. GFR  (NO RACE VARIABLE): >60 ML/MIN/1.73 M^2
FERRITIN SERPL-MCNC: 953 NG/ML (ref 20–300)
GLUCOSE SERPL-MCNC: 100 MG/DL (ref 70–110)
HCT VFR BLD AUTO: 45.5 % (ref 37–48.5)
HGB BLD-MCNC: 14.8 G/DL (ref 12–16)
IMM GRANULOCYTES # BLD AUTO: 0.01 K/UL (ref 0–0.04)
IMM GRANULOCYTES NFR BLD AUTO: 0.2 % (ref 0–0.5)
IRON SERPL-MCNC: 133 UG/DL (ref 30–160)
LKM-1 AB SER-ACNC: 1.5 UNITS (ref 0–20)
LYMPHOCYTES # BLD AUTO: 1.6 K/UL (ref 1–4.8)
LYMPHOCYTES NFR BLD: 29.1 % (ref 18–48)
MCH RBC QN AUTO: 27.7 PG (ref 27–31)
MCHC RBC AUTO-ENTMCNC: 32.5 G/DL (ref 32–36)
MCV RBC AUTO: 85 FL (ref 82–98)
MONOCYTES # BLD AUTO: 0.7 K/UL (ref 0.3–1)
MONOCYTES NFR BLD: 12.7 % (ref 4–15)
NEUTROPHILS # BLD AUTO: 2.8 K/UL (ref 1.8–7.7)
NEUTROPHILS NFR BLD: 52.6 % (ref 38–73)
NRBC BLD-RTO: 0 /100 WBC
PLATELET # BLD AUTO: 391 K/UL (ref 150–450)
PMV BLD AUTO: 9.2 FL (ref 9.2–12.9)
POTASSIUM SERPL-SCNC: 3.5 MMOL/L (ref 3.5–5.1)
PROCALCITONIN SERPL IA-MCNC: 0.26 NG/ML
PROT SERPL-MCNC: 6.6 G/DL (ref 6–8.4)
RBC # BLD AUTO: 5.35 M/UL (ref 4–5.4)
SATURATED IRON: 38 % (ref 20–50)
SODIUM SERPL-SCNC: 138 MMOL/L (ref 136–145)
TOTAL IRON BINDING CAPACITY: 346 UG/DL (ref 250–450)
TRANSFERRIN SERPL-MCNC: 247 MG/DL (ref 200–375)
WBC # BLD AUTO: 5.37 K/UL (ref 3.9–12.7)

## 2022-08-15 PROCEDURE — 85025 COMPLETE CBC W/AUTO DIFF WBC: CPT | Performed by: NURSE PRACTITIONER

## 2022-08-15 PROCEDURE — 84145 PROCALCITONIN (PCT): CPT | Performed by: INTERNAL MEDICINE

## 2022-08-15 PROCEDURE — 63600175 PHARM REV CODE 636 W HCPCS: Performed by: INTERNAL MEDICINE

## 2022-08-15 PROCEDURE — 82728 ASSAY OF FERRITIN: CPT | Performed by: INTERNAL MEDICINE

## 2022-08-15 PROCEDURE — 83516 IMMUNOASSAY NONANTIBODY: CPT | Performed by: INTERNAL MEDICINE

## 2022-08-15 PROCEDURE — 25000003 PHARM REV CODE 250: Performed by: NURSE PRACTITIONER

## 2022-08-15 PROCEDURE — 36415 COLL VENOUS BLD VENIPUNCTURE: CPT | Performed by: INTERNAL MEDICINE

## 2022-08-15 PROCEDURE — 94761 N-INVAS EAR/PLS OXIMETRY MLT: CPT

## 2022-08-15 PROCEDURE — 99900035 HC TECH TIME PER 15 MIN (STAT)

## 2022-08-15 PROCEDURE — 80053 COMPREHEN METABOLIC PANEL: CPT | Performed by: NURSE PRACTITIONER

## 2022-08-15 PROCEDURE — 25000003 PHARM REV CODE 250: Performed by: INTERNAL MEDICINE

## 2022-08-15 PROCEDURE — A4216 STERILE WATER/SALINE, 10 ML: HCPCS | Performed by: NURSE PRACTITIONER

## 2022-08-15 PROCEDURE — 82390 ASSAY OF CERULOPLASMIN: CPT | Performed by: INTERNAL MEDICINE

## 2022-08-15 PROCEDURE — 12000002 HC ACUTE/MED SURGE SEMI-PRIVATE ROOM

## 2022-08-15 PROCEDURE — 99900031 HC PATIENT EDUCATION (STAT)

## 2022-08-15 PROCEDURE — 82103 ALPHA-1-ANTITRYPSIN TOTAL: CPT | Performed by: INTERNAL MEDICINE

## 2022-08-15 PROCEDURE — 84466 ASSAY OF TRANSFERRIN: CPT | Performed by: INTERNAL MEDICINE

## 2022-08-15 RX ORDER — POTASSIUM CHLORIDE 7.45 MG/ML
20 INJECTION INTRAVENOUS
Status: DISCONTINUED | OUTPATIENT
Start: 2022-08-15 | End: 2022-08-18 | Stop reason: HOSPADM

## 2022-08-15 RX ORDER — POTASSIUM CHLORIDE 7.45 MG/ML
40 INJECTION INTRAVENOUS
Status: DISCONTINUED | OUTPATIENT
Start: 2022-08-15 | End: 2022-08-18 | Stop reason: HOSPADM

## 2022-08-15 RX ORDER — POTASSIUM CHLORIDE 20 MEQ/1
40 TABLET, EXTENDED RELEASE ORAL
Status: DISCONTINUED | OUTPATIENT
Start: 2022-08-15 | End: 2022-08-18 | Stop reason: HOSPADM

## 2022-08-15 RX ORDER — POTASSIUM CHLORIDE 20 MEQ/1
20 TABLET, EXTENDED RELEASE ORAL
Status: DISCONTINUED | OUTPATIENT
Start: 2022-08-15 | End: 2022-08-18 | Stop reason: HOSPADM

## 2022-08-15 RX ORDER — MAGNESIUM SULFATE HEPTAHYDRATE 40 MG/ML
4 INJECTION, SOLUTION INTRAVENOUS
Status: DISCONTINUED | OUTPATIENT
Start: 2022-08-15 | End: 2022-08-18 | Stop reason: HOSPADM

## 2022-08-15 RX ORDER — MAGNESIUM SULFATE 1 G/100ML
1 INJECTION INTRAVENOUS
Status: DISCONTINUED | OUTPATIENT
Start: 2022-08-15 | End: 2022-08-18 | Stop reason: HOSPADM

## 2022-08-15 RX ORDER — MAGNESIUM SULFATE HEPTAHYDRATE 40 MG/ML
2 INJECTION, SOLUTION INTRAVENOUS
Status: DISCONTINUED | OUTPATIENT
Start: 2022-08-15 | End: 2022-08-18 | Stop reason: HOSPADM

## 2022-08-15 RX ORDER — LANOLIN ALCOHOL/MO/W.PET/CERES
800 CREAM (GRAM) TOPICAL
Status: DISCONTINUED | OUTPATIENT
Start: 2022-08-15 | End: 2022-08-18 | Stop reason: HOSPADM

## 2022-08-15 RX ADMIN — PIPERACILLIN SODIUM AND TAZOBACTAM SODIUM 3.38 G: 3; .375 INJECTION, POWDER, LYOPHILIZED, FOR SOLUTION INTRAVENOUS at 02:08

## 2022-08-15 RX ADMIN — SODIUM CHLORIDE, PRESERVATIVE FREE 3 ML: 5 INJECTION INTRAVENOUS at 06:08

## 2022-08-15 RX ADMIN — PIPERACILLIN SODIUM AND TAZOBACTAM SODIUM 3.38 G: 3; .375 INJECTION, POWDER, LYOPHILIZED, FOR SOLUTION INTRAVENOUS at 09:08

## 2022-08-15 RX ADMIN — SODIUM CHLORIDE, PRESERVATIVE FREE 3 ML: 5 INJECTION INTRAVENOUS at 02:08

## 2022-08-15 NOTE — PROGRESS NOTES
[facility] Medicine  Progress Note    Patient name: Kina Pat  MRN: [id number]  Admit Date: 8/11/2022   LOS: 3 days     SUBJECTIVE:     Principal problem: Jaundice    Interval History:  Denies current abdominal pain. No present nausea.  Bilirubin increased to 20.  LFTs continuing to slowly improve and are 1400 today.  Seen by Dr. Green who ordered a doppler Liver US as well as other labs to evaluate her hepatitis.  Liver biopsy discussed in the event these are unrevealing. I am hopeful her viral hepatitis panel will result tomorrow. Patient supplements she has also been on gabapentin for the last 2.5 months.  I have reviewed and there is post marketing report of hepatotoxicity but no specifics. I passed this along to Dr. Green.    Hospital Course:    Patient presented to the ED with abdominal pain for the past few weeks with jaundice for the  Past 3-4 days.  She reports tylenol use- level checked and normal. Admission labs significant for bilirubin of 15, AST 1733, ALT 1319. Lipase was normal.  Alk phosph was normal. INR 1.2.  US in the ED with pronounced gallbladder thickening but normal CBD diameter. No intrahepatic biliary ductal dilatation seen.  CT abd and pelvis ordered and revealed diffuse wall thickening of the gallbladder with pericholecystic edema and a small enhancing nodule anterior to the gallbladder that could be a prominent node. She was admitted and GI and General surgery consulted.  I started Zosyn given concern for acute cholecystitis. Seen by GI who started NAC while awaiting tylenol level.  Discussed with GI after normal tylenol level revealed and preferred to continue it for now but it has made patient nauseated and she has declined it.  8/13 RN reports declining Zosyn as well as also feels it is making her nauseated. Labs for viral hepatitis, autoimmune disorders, etc, sent but these are send outs.  Seen by General Surgery and felt that without biliary  dilatation, it is unlikely an obstructive process and it is felt that more than likely represents acute hepatitis as the etiology of her thickened and edematous gallbladder.It is also felt that she does not have acalculous cholecystitis given her hemodynamic stability. 8/14  JOSE returned as negative.  I called lab to check on results of acute hepatitis panel and unfortunately it does not appear that lab received the sample 8/11.  Lab kindly stat manuel a different sample- expect results Tuesday/wednesday as this is a send out.      Scheduled Meds:   acetylcysteine  50 mg/kg Intravenous Once    acetylcysteine  100 mg/kg Intravenous Once    sodium chloride 0.9%  3 mL Intravenous Q8H     Continuous Infusions:    PRN Meds:aluminum-magnesium hydroxide-simethicone, calcium chloride IVPB, calcium chloride IVPB, calcium chloride IVPB, dextrose 50%, dextrose 50%, glucagon (human recombinant), glucose, glucose, magnesium oxide, magnesium sulfate IVPB, magnesium sulfate IVPB, magnesium sulfate IVPB, magnesium sulfate IVPB, melatonin, naloxone, ondansetron, oxyCODONE, potassium chloride in water, potassium chloride in water, potassium chloride in water, potassium chloride in water, potassium chloride, potassium chloride, potassium chloride, potassium chloride, prochlorperazine, sodium phosphate IVPB, sodium phosphate IVPB, sodium phosphate IVPB, sodium phosphate IVPB, sodium phosphate IVPB    Review of patient's allergies indicates:  No Known Allergies    Review of Systems: As per interval history    OBJECTIVE:     Vital Signs (Most Recent)  Temp: 98.4 °F (36.9 °C) (08/15/22 1140)  Pulse: 69 (08/15/22 1140)  Resp: 18 (08/15/22 1140)  BP: 114/76 (08/15/22 1140)  SpO2: 95 % (08/15/22 1140)    Vital Signs Range (Last 24H):  Temp:  [97.8 °F (36.6 °C)-98.4 °F (36.9 °C)]   Pulse:  [62-81]   Resp:  [17-18]   BP: (114-124)/(64-82)   SpO2:  [95 %-99 %]     I & O (Last 24H):    Intake/Output Summary (Last 24 hours) at 8/15/2022  1417  Last data filed at 8/15/2022 0600  Gross per 24 hour   Intake 790 ml   Output --   Net 790 ml       Physical Exam:    Vitals and nursing note reviewed.     Constitutional:       General: Not in acute distress.       Appearance: Well-developed.   HENT:      Head: Normocephalic and atraumatic.   Eyes:      Pupils: Pupils are equal, round, and reactive to light.   Scleral icterus.  Cardiovascular:      Rate and Rhythm: Regular rhythm.   Pulmonary:      Effort: Pulmonary effort is normal.      Breath sounds: Normal breath sounds. No wheezing.   Abdominal:      General: There is no distension.      Palpations: Abdomen is soft.      Tenderness: There is no abdominal tenderness. There is no guarding or rebound.   Musculoskeletal:         General: Normal range of motion.      Cervical back: Normal range of motion.   Skin:     Findings: No rash.  Jaundiced.  Neurological:      Mental Status: Alert and oriented to person, place, and time.      Cranial Nerves: No cranial nerve deficit.      Sensory: No sensory deficit.     Laboratory:  I have reviewed all pertinent lab results within the past 24 hours.  CBC:   Recent Labs   Lab 08/15/22  0610   WBC 5.37   RBC 5.35   HGB 14.8   HCT 45.5      MCV 85   MCH 27.7   MCHC 32.5     CMP:   Recent Labs   Lab 08/15/22  0610      CALCIUM 9.0   ALBUMIN 3.1*   PROT 6.6      K 3.5   CO2 22*      BUN 6   CREATININE 0.4*   ALKPHOS 94   ALT 1,205*   AST 1,483*   BILITOT 20.3*       Diagnostic Results:      ASSESSMENT/PLAN:         Active Hospital Problems    Diagnosis  POA    *Jaundice [R17]  Yes    Hyponatremia [E87.1]  Yes    Hepatitis [K75.9]  Yes    Hypokalemia [E87.6]  Yes     K+3.4. Replenish  Monitor labs         Resolved Hospital Problems   No resolved problems to display.         Plan:     -GI consulted given transaminitis and hyperbilirubinemia.  Hepatitis favored but etiology unknown.  Labs sent- JOSE is negative the rest are pending results.   Tylenol level checked and normal.  Discussed possibility of liver biopsy pending results of lab tests. Started Gabapentin within last 2.5 months as above and she questions if this could be causing this.  -IV NAC started but has made patient nauseated and thus she has declined further.  -Distended gallbladder on imaging with wall edema. No biliary dilatation.  General Surgery consulted and does not feel this is acute acalculous cholecystitis.  Empiric antibiotics with zosyn.  No systemic signs of infection. Procal came back just above normal at 0.26 and thus will stop Zosyn and monitor.  If clinical condition decompensates, will restart and re evaluate with surgery.   -Replacing electrolytes.   -Trending hepatic function  --prn IV antiemetic      VTE Risk Mitigation (From admission, onward)         Ordered     IP VTE HIGH RISK PATIENT  Once         08/11/22 2147     Place sequential compression device  Until discontinued         08/11/22 2147                  Department Hospital Medicine  [facility]  Bailey Love MD  Date of service: 08/15/2022

## 2022-08-15 NOTE — PROGRESS NOTES
"Gastroenterology    CC: elevated liver enzymes    S:  Pt w/o complaints.  Denies pruritus.  No fever.  No abd pain.  Off nac due to nausea.  Hep panel was lost...  Was apparently ingesting 2 g tylenol daily for weeks.  Uses supplements chronically.      JOSE negative  LKM Ab pending  SMA pending         Latest Reference Range & Units 08/11/22 10:19 08/12/22 05:30 08/13/22 05:52 08/14/22 05:46 08/15/22 06:10   Albumin 3.5 - 5.2 g/dL 3.3 (L) 2.8 (L) 3.0 (L) 2.7 (L) 3.1 (L)   BILIRUBIN TOTAL 0.1 - 1.0 mg/dL 15.2 (H) 14.8 (H) 18.0 (H) 16.2 (H) 20.3 (H)   AST 10 - 40 U/L 1,733 (H) 1,931 (H) 1,929 (H) 1,652 (H) 1,483 (H)   ALT 10 - 44 U/L 1,319 (H) 1,331 (H) 1,410 (H) 1,266 (H) 1,205 (H)   (L): Data is abnormally low  (H): Data is abnormally high    No past medical history on file.    Review of Systems  General ROS: negative for chills, fever or weight loss  Cardiovascular ROS: no chest pain or dyspnea on exertion  Gastrointestinal ROS: no abdominal pain, change in bowel habits, or black/ bloody stools    Physical Examination  /60 mmHg  Pulse 67  Temp(Src) 97.9 °F (36.6 °C) (Oral)  Resp 15  Ht 5' 8" (1.727 m)  Wt 74.9 kg (165 lb 2 oz)  BMI 25.11 kg/m2  SpO2 100%  LMP  (LMP Unknown)  Breastfeeding? No  General appearance: alert, cooperative, no distress, jaundiced  HENT: Normocephalic, atraumatic, neck symmetrical, no nasal discharge   Lungs: clear to auscultation bilaterally, no dullness to percussion bilaterally  Heart: regular rate and rhythm without rub; no displacement of the PMI   Abdomen: soft, non-tender; bowel sounds normoactive; no organomegaly  Extremities: extremities symmetric; no clubbing, cyanosis, or edema  Neurologic: Alert and oriented X 3, normal strength, normal coordination and gait    Labs:  Lab Results   Component Value Date    ALT 1,205 (H) 08/15/2022    AST 1,483 (H) 08/15/2022    ALKPHOS 94 08/15/2022    BILITOT 20.3 (H) 08/15/2022     Lab Results   Component Value Date    INR 1.2 " 08/14/2022    INR 1.2 08/12/2022     CMP  Sodium   Date Value Ref Range Status   08/15/2022 138 136 - 145 mmol/L Final     Potassium   Date Value Ref Range Status   08/15/2022 3.5 3.5 - 5.1 mmol/L Final     Chloride   Date Value Ref Range Status   08/15/2022 105 95 - 110 mmol/L Final     CO2   Date Value Ref Range Status   08/15/2022 22 (L) 23 - 29 mmol/L Final     Glucose   Date Value Ref Range Status   08/15/2022 100 70 - 110 mg/dL Final     BUN   Date Value Ref Range Status   08/15/2022 6 6 - 20 mg/dL Final     Creatinine   Date Value Ref Range Status   08/15/2022 0.4 (L) 0.5 - 1.4 mg/dL Final     Calcium   Date Value Ref Range Status   08/15/2022 9.0 8.7 - 10.5 mg/dL Final     Total Protein   Date Value Ref Range Status   08/15/2022 6.6 6.0 - 8.4 g/dL Final     Albumin   Date Value Ref Range Status   08/15/2022 3.1 (L) 3.5 - 5.2 g/dL Final     Total Bilirubin   Date Value Ref Range Status   08/15/2022 20.3 (H) 0.1 - 1.0 mg/dL Final     Comment:     For infants and newborns, interpretation of results should be based  on gestational age, weight and in agreement with clinical  observations.    Premature Infant recommended reference ranges:  Up to 24 hours.............<8.0 mg/dL  Up to 48 hours............<12.0 mg/dL  3-5 days..................<15.0 mg/dL  6-29 days.................<15.0 mg/dL  Reviewed by Technologist.       Alkaline Phosphatase   Date Value Ref Range Status   08/15/2022 94 55 - 135 U/L Final     AST   Date Value Ref Range Status   08/15/2022 1,483 (H) 10 - 40 U/L Final     ALT   Date Value Ref Range Status   08/15/2022 1,205 (H) 10 - 44 U/L Final     Anion Gap   Date Value Ref Range Status   08/15/2022 11 8 - 16 mmol/L Final     Lab Results   Component Value Date    WBC 5.37 08/15/2022    HGB 14.8 08/15/2022    HCT 45.5 08/15/2022    MCV 85 08/15/2022     08/15/2022              Assessment:   47 y.o. female with acute liver injury- possibly supplement vs tylenol related vs AIH vs hep a  etc.    Plan:  Get doppler liver US  If hepatitis panel negative and not improving will recommend liver biopsy      Trevin Green  [facility]  Cell: [phone]

## 2022-08-15 NOTE — CARE UPDATE
08/15/22 1051   Patient Assessment/Suction   Level of Consciousness (AVPU) alert   Respiratory Effort Normal;Unlabored   Expansion/Accessory Muscles/Retractions expansion symmetric   Rhythm/Pattern, Respiratory unlabored   PRE-TX-O2   O2 Device (Oxygen Therapy) room air   SpO2 99 %   Pulse Oximetry Type Intermittent   $ Pulse Oximetry - Multiple Charge Pulse Oximetry - Multiple   Pulse 74   Resp 18   Education   $ Education 15 min   Respiratory Evaluation   $ Care Plan Tech Time 15 min

## 2022-08-15 NOTE — PLAN OF CARE
Problem: Adult Inpatient Plan of Care  Goal: Optimal Comfort and Wellbeing  Outcome: Ongoing, Progressing     Problem: Adult Inpatient Plan of Care  Goal: Absence of Hospital-Acquired Illness or Injury  Outcome: Ongoing, Progressing

## 2022-08-16 PROBLEM — E87.6 HYPOKALEMIA: Status: RESOLVED | Noted: 2022-08-11 | Resolved: 2022-08-16

## 2022-08-16 PROBLEM — K75.9 HEPATITIS: Status: RESOLVED | Noted: 2022-08-12 | Resolved: 2022-08-16

## 2022-08-16 PROBLEM — E87.1 HYPONATREMIA: Status: RESOLVED | Noted: 2022-08-12 | Resolved: 2022-08-16

## 2022-08-16 PROBLEM — B16.9 ACUTE VIRAL HEPATITIS B WITHOUT HEPATIC COMA: Status: ACTIVE | Noted: 2022-08-16

## 2022-08-16 LAB
A1AT SERPL-MCNC: 205 MG/DL (ref 101–187)
ACTIN IGG SERPL-ACNC: 12 UNITS (ref 0–19)
ALBUMIN SERPL BCP-MCNC: 2.6 G/DL (ref 3.5–5.2)
ALP SERPL-CCNC: 67 U/L (ref 55–135)
ALT SERPL W/O P-5'-P-CCNC: 827 U/L (ref 10–44)
ANION GAP SERPL CALC-SCNC: 9 MMOL/L (ref 8–16)
AST SERPL-CCNC: 949 U/L (ref 10–40)
BASOPHILS # BLD AUTO: 0.06 K/UL (ref 0–0.2)
BASOPHILS NFR BLD: 1.2 % (ref 0–1.9)
BILIRUB SERPL-MCNC: 18.9 MG/DL (ref 0.1–1)
BUN SERPL-MCNC: 5 MG/DL (ref 6–20)
CALCIUM SERPL-MCNC: 8.6 MG/DL (ref 8.7–10.5)
CERULOPLASMIN SERPL-MCNC: 25.6 MG/DL (ref 19–39)
CHLORIDE SERPL-SCNC: 103 MMOL/L (ref 95–110)
CO2 SERPL-SCNC: 25 MMOL/L (ref 23–29)
CREAT SERPL-MCNC: 0.5 MG/DL (ref 0.5–1.4)
DIFFERENTIAL METHOD: ABNORMAL
EOSINOPHIL # BLD AUTO: 0.2 K/UL (ref 0–0.5)
EOSINOPHIL NFR BLD: 3.2 % (ref 0–8)
ERYTHROCYTE [DISTWIDTH] IN BLOOD BY AUTOMATED COUNT: 20.4 % (ref 11.5–14.5)
EST. GFR  (NO RACE VARIABLE): >60 ML/MIN/1.73 M^2
GLUCOSE SERPL-MCNC: 87 MG/DL (ref 70–110)
HAV IGM SERPL QL IA: NEGATIVE
HBV CORE IGM SERPL QL IA: POSITIVE
HBV SURFACE AG SERPL QL IA: POSITIVE
HCT VFR BLD AUTO: 42.5 % (ref 37–48.5)
HCV AB S/CO SERPL IA: <0.1 S/CO RATIO (ref 0–0.9)
HCV AB SERPL QL IA: NORMAL
HGB BLD-MCNC: 13.6 G/DL (ref 12–16)
IMM GRANULOCYTES # BLD AUTO: 0.01 K/UL (ref 0–0.04)
IMM GRANULOCYTES NFR BLD AUTO: 0.2 % (ref 0–0.5)
LYMPHOCYTES # BLD AUTO: 1.5 K/UL (ref 1–4.8)
LYMPHOCYTES NFR BLD: 29.5 % (ref 18–48)
MCH RBC QN AUTO: 27.3 PG (ref 27–31)
MCHC RBC AUTO-ENTMCNC: 32 G/DL (ref 32–36)
MCV RBC AUTO: 85 FL (ref 82–98)
MONOCYTES # BLD AUTO: 0.7 K/UL (ref 0.3–1)
MONOCYTES NFR BLD: 14.2 % (ref 4–15)
NEUTROPHILS # BLD AUTO: 2.6 K/UL (ref 1.8–7.7)
NEUTROPHILS NFR BLD: 51.7 % (ref 38–73)
NRBC BLD-RTO: 0 /100 WBC
PLATELET # BLD AUTO: 315 K/UL (ref 150–450)
PMV BLD AUTO: 9.3 FL (ref 9.2–12.9)
POTASSIUM SERPL-SCNC: 3.5 MMOL/L (ref 3.5–5.1)
PROT SERPL-MCNC: 5.6 G/DL (ref 6–8.4)
RBC # BLD AUTO: 4.99 M/UL (ref 4–5.4)
SODIUM SERPL-SCNC: 137 MMOL/L (ref 136–145)
WBC # BLD AUTO: 5.01 K/UL (ref 3.9–12.7)

## 2022-08-16 PROCEDURE — A4216 STERILE WATER/SALINE, 10 ML: HCPCS | Performed by: NURSE PRACTITIONER

## 2022-08-16 PROCEDURE — 12000002 HC ACUTE/MED SURGE SEMI-PRIVATE ROOM

## 2022-08-16 PROCEDURE — 80053 COMPREHEN METABOLIC PANEL: CPT | Performed by: NURSE PRACTITIONER

## 2022-08-16 PROCEDURE — 25000003 PHARM REV CODE 250: Performed by: NURSE PRACTITIONER

## 2022-08-16 PROCEDURE — 36415 COLL VENOUS BLD VENIPUNCTURE: CPT | Performed by: NURSE PRACTITIONER

## 2022-08-16 PROCEDURE — 85025 COMPLETE CBC W/AUTO DIFF WBC: CPT | Performed by: NURSE PRACTITIONER

## 2022-08-16 RX ADMIN — SODIUM CHLORIDE, PRESERVATIVE FREE 3 ML: 5 INJECTION INTRAVENOUS at 06:08

## 2022-08-16 RX ADMIN — SODIUM CHLORIDE, PRESERVATIVE FREE 3 ML: 5 INJECTION INTRAVENOUS at 02:08

## 2022-08-16 NOTE — PROGRESS NOTES
[facility] Medicine    Progress Note    Patient Name: Kina Pat  MRN: [id number]  Patient Class: IP- Inpatient   Admission Date: 8/11/2022  9:46 AM  Length of Stay: 4  Attending Physician: Don Espana MD  Primary Care Provider: [facility]  Face-to-Face encounter date: 08/16/2022  Code status:  Chief Complaint: Abdominal Pain and Jaundice        Subjective:    HPI:  Ms. Pat is a 47-year-old female with PMHx chronic migraines and a remote history of seizure disorder. She presented to [facility] Emergency room with complaints abdominal pain for several weeks. Associated symptoms nausea and vomiting. Today she needed to come to the ED because she was becoming lethargic at times due to abdominal pain. She endorses being anorectic and has lost 10 lbs is during this time. She endorses being very fatigued and not being able to do her daily functioning. Her stools have been light and urine dark. She denies any history of hepatitis, pancreatitis, or other GI diseases. Patient denies being around anyone sick or going out of the country. He diet has not changed.Denies fever or chills. In the ED her initial workup Potassium 3.4, Sodium 133, Bilirubin 15.2, AST 1433, ALT 1319, Lipase 34. Urinalysis resulted WBC 40, few bacteria. She was treated in the ED with Rocephin 1g IV. Abdominal ultrasound impressions were gallbladder thickening with no stones. GI has been consulted. Abdominal/pelvis CT pending, Hepatitis panel pending. Patient will be admitted to Hospital Medicine for further workup and medical management.     Interval History:   Patient presented to the ED with abdominal pain for the past few weeks with jaundice for the  Past 3-4 days.  She reports tylenol use- level checked and normal. Admission labs significant for bilirubin of 15, AST 1733, ALT 1319. Lipase was normal.  Alk phosph was normal. INR 1.2.  US in the ED with pronounced gallbladder thickening  but normal CBD diameter. No intrahepatic biliary ductal dilatation seen.  CT abd and pelvis ordered and revealed diffuse wall thickening of the gallbladder with pericholecystic edema and a small enhancing nodule anterior to the gallbladder that could be a prominent node. She was admitted and GI and General surgery consulted.  I started Zosyn given concern for acute cholecystitis. Seen by GI who started NAC while awaiting tylenol level.  Discussed with GI after normal tylenol level revealed and preferred to continue it for now but it has made patient nauseated and she has declined it.  8/13 RN reports declining Zosyn as well as also feels it is making her nauseated. Labs for viral hepatitis, autoimmune disorders, etc, sent but these are send outs.  Seen by General Surgery and felt that without biliary dilatation, it is unlikely an obstructive process and it is felt that more than likely represents acute hepatitis as the etiology of her thickened and edematous gallbladder.It is also felt that she does not have acalculous cholecystitis given her hemodynamic stability.     8/12: Patient is quite nauseated at the time of my exam.  She had just received IV NAC.  History limited due to her nausea.  Discussed lab findings and the opinion of GI.  Tylenol level checked given frequent use and was normal.     8/13: Symptoms unchanged though the nausea that she was experiencing yesterday is less. Bilirubin has increased to 18.  LFTs remain the same. She is understandably frustrated at the lack of diagnosis and thus lack of treatment plan and time line. We went over results and various clinical scenarios.     8/14: Denies current abdominal pain. No present nausea.  Thankfully, bilirubin is improving today.  LFTs slightly improved.  JOSE returned as negative.  I called lab to check on results of acute hepatitis panel and unfortunately it does not appear that lab received the sample 8/11.  Lab kindly stat manuel a different sample-  expect results Tuesday/wednesday as this is a send out.  Discussed with GI given persistent elevation in numbers and after evaluation, they have recommended to continue present course while trending and waiting for send out test results.     8/15: Denies current abdominal pain. No present nausea.  Bilirubin increased to 20.  LFTs continuing to slowly improve and are 1400 today.  Seen by Dr. Green who ordered a doppler Liver US as well as other labs to evaluate her hepatitis.  Liver biopsy discussed in the event these are unrevealing. I am hopeful her viral hepatitis panel will result tomorrow. Patient supplements she has also been on gabapentin for the last 2.5 months.  I have reviewed and there is post marketing report of hepatotoxicity but no specifics. I passed this along to Dr. Green.       8/16:  Liver enzymes trending downwards and hepatitis panel positive for hepatitis B.  case discussed with gastroenterologist and if numbers continue to improve, patient can be discharged tomorrow. Education be provided to patient on vaccination of members of household.  Patient is doing well.No concerns/issues overnight reported by the patient or the nursing staff.    Review of Systems All other Review of Systems were found to be negative expect for that mentioned already in HPI.     Objective:     Vitals:    08/15/22 2334 08/16/22 0436 08/16/22 0730 08/16/22 1230   BP: (!) 144/80 121/76 (!) 140/84 120/82   BP Location: Right arm Right arm     Patient Position: Lying Lying     Pulse: 78 72 78 85   Resp: 16 17 18 18   Temp: 98.2 °F (36.8 °C) 97.9 °F (36.6 °C) 98.3 °F (36.8 °C) 97.8 °F (36.6 °C)   TempSrc: Oral Oral Oral Oral   SpO2: 99% 97% 96% 95%   Weight:       Height:            Vitals reviewed.  Constitutional: No distress.   HENT: NC  Head: Atraumatic.   Cardiovascular: Normal rate, regular rhythm and normal heart sounds.   Pulmonary/Chest: Effort normal. No wheezes.   Abdominal: Soft. Bowel sounds are normal. No  distension and no mass. No tenderness  Neurological: Alert.   Skin: Skin is warm and dry.   Psych: Appropriate mood and affect    Following labs were Reviewed   CBC:  Recent Labs   Lab 08/16/22 0524   WBC 5.01   HGB 13.6   HCT 42.5        CMP:  Recent Labs   Lab 08/16/22 0524   CALCIUM 8.6*   ALBUMIN 2.6*   PROT 5.6*      K 3.5   CO2 25      BUN 5*   CREATININE 0.5   ALKPHOS 67   *   *   BILITOT 18.9*       Micro Results  Microbiology Results (last 7 days)     Procedure Component Value Units Date/Time    Urine culture [[id number]] Collected: 08/11/22 1019    Order Status: Completed Specimen: Urine Updated: 08/14/22 0648     Urine Culture, Routine No growth    Narrative:      Specimen Source->Urine           Radiology Reports  CT Abdomen Pelvis With Contrast  Result Date: 8/11/2022  IMPRESSION: Partially contracted gallbladder demonstrating diffuse wall thickening and pericholecystic edema. A small enhancing nodule anterior to the gallbladder could reflect a mildly prominent lymph node. Correlate with evidence of hepatocellular disease or acalculus cholecystitis. Duplicated right renal collecting system with chronic obstruction of the lower pole moiety by a large proximal right ureteral calculus. There is diffuse cortical thinning involving mid to lower right kidney. There are nonobstructing left renal calculi. Small amount of ascites. Electronically signed by:  Bev Hernandez MD  8/11/2022 3:18 PM CDT Workstation: [id number]    US Abdomen Limited (Gallbladder)  Result Date: 8/11/2022   IMPRESSION: 1. Normal size and sonographic appearance of the liver. 2. Pronounced gallbladder thickening without gallstones, nonspecific. This could be due to intrinsic liver disease and or hypoproteinemia, or acalculous cholecystitis. 3. Multiple simple right renal cysts. Electronically signed by:  Raulito Alamo MD  8/11/2022 12:44 PM CDT Workstation: [id number]    US Liver with Doppler (xpd)  Result  Date: 8/15/2022   IMPRESSION: No significant abnormality demonstrated. Electronically signed by:  Bev Hernandez MD  8/15/2022 4:42 PM CDT Workstation: [id number]       Meds  Scheduled Meds:   acetylcysteine  50 mg/kg Intravenous Once    acetylcysteine  100 mg/kg Intravenous Once    sodium chloride 0.9%  3 mL Intravenous Q8H     Continuous Infusions:  PRN Meds:.aluminum-magnesium hydroxide-simethicone, calcium chloride IVPB, calcium chloride IVPB, calcium chloride IVPB, dextrose 50%, dextrose 50%, glucagon (human recombinant), glucose, glucose, magnesium oxide, magnesium sulfate IVPB, magnesium sulfate IVPB, magnesium sulfate IVPB, magnesium sulfate IVPB, melatonin, naloxone, ondansetron, oxyCODONE, potassium chloride in water, potassium chloride in water, potassium chloride in water, potassium chloride in water, potassium chloride, potassium chloride, potassium chloride, potassium chloride, prochlorperazine, sodium phosphate IVPB, sodium phosphate IVPB, sodium phosphate IVPB, sodium phosphate IVPB, sodium phosphate IVPB.    Assessment & Plan:     Active Hospital Problems    Diagnosis    *Acute viral hepatitis B without hepatic coma    Jaundice     Plan:      -GI on board  -continue to trend  --prn IV antiemetic      Discharge Planning:   Is the patient medically ready for discharge?: no    Reason for patient still in hospital (select all that apply): Patient trending condition and Treatment    Above encounter included review of the medical records, interviewing and examining the patient face-to-face, discussion with family and other health care providers, ordering and interpreting lab/test results and formulating a plan of care.     Medical Decision Making:      [_] Low Complexity  [_] Moderate Complexity  [x] High Complexity      Don Espana MD  Department of Hospital Medicine   [facility]

## 2022-08-16 NOTE — PROGRESS NOTES
"[facility]  Adult Nutrition   Progress Note (Initial Assessment)     SUMMARY     Recommendations  Recommendation/Intervention:   1. Continue current diet as tolerated.   2. Recommend MVI and B 12 supplements only at d/c.  3.  Will offer Ensure Clear BID.  4. to obtain daily meal choices.    Goals:   1. Patient to consume >= 75% estimated needs.   2. Lab values  trend to target range.  Nutrition Goal Status: new    Communication of RD Recs: reviewed with RN    Dietitian Rounds Brief  LOS screen. Patient admit due to  N/V normal BMs, abdom. pain for several weeks. OTC supplements used-- milk thistle, vitamin C, B vitamins, MVI, zinc and least 2g of tylenol every day for several weeks per H and P. Patient LFTs trending to target,+ hep B results, pt remains Airborne/Droplet isolation at this time. Plan to d/c tomorrow. Good intake per nursing.    Diet order:   Current Diet Order: Vegetarian Lacto Ovo      Evaluation of Received Nutrient/Fluid Intake  Energy Calories Required: not meeting needs  Protein Required: not meeting needs  Fluid Required: not meeting needs  Tolerance: tolerating     % Intake of Estimated Energy Needs: 50 - 75 %  % Meal Intake: 50 - 75 %    No intake or output data in the 24 hours ending 08/16/22 0958     Anthropometrics  Temp: 98.3 °F (36.8 °C)  Height Method: Stated  Height: 5' 6" (167.6 cm)  Height (inches): 66 in  Weight Method: Bed Scale  Weight: 97 kg (213 lb 13.5 oz)  Weight (lb): 213.85 lb  Ideal Body Weight (IBW), Female: 130 lb  % Ideal Body Weight, Female (lb): 164.5 %  BMI (Calculated): 34.5       Estimated/Assessed Needs  Weight Used For Calorie Calculations: 97 kg (213 lb 13.5 oz)  Energy Calorie Requirements (kcal): 5900-1434 kcal/day (20-25 kcal/kg)     Protein Requirements: 59-88 gm/day (1.0-1.5 gm/kg IBW)  Weight Used For Protein Calculations: 59 kg (130 lb 1.1 oz) (IBW)     Estimated Fluid Requirement Method: RDA Method  RDA Method (mL): 1940       Reason " for Assessment  Reason For Assessment: length of stay  Diagnosis: liver disease  Relevant Medical History: chronic migraines and a remote history of seizure disorder.  Interdisciplinary Rounds: did not attend    Nutrition/Diet History  Food Allergies: NKFA  Factors Affecting Nutritional Intake: abdominal pain    Nutrition Risk Screen  Nutrition Risk Screen: no indicators present     MST Score: 0  Have you recently lost weight without trying?: No  Weight loss score: 0  Have you been eating poorly because of a decreased appetite?: No  Appetite score: 0       Weight History:  Wt Readings from Last 5 Encounters:   08/11/22 97 kg (213 lb 13.5 oz)        Lab/Procedures/Meds: Pertinent Labs/Meds Reviewed    Medications:Pertinent Medications Reviewed  Scheduled Meds:   acetylcysteine  50 mg/kg Intravenous Once    acetylcysteine  100 mg/kg Intravenous Once    sodium chloride 0.9%  3 mL Intravenous Q8H     Continuous Infusions:  PRN Meds:.aluminum-magnesium hydroxide-simethicone, calcium chloride IVPB, calcium chloride IVPB, calcium chloride IVPB, dextrose 50%, dextrose 50%, glucagon (human recombinant), glucose, glucose, magnesium oxide, magnesium sulfate IVPB, magnesium sulfate IVPB, magnesium sulfate IVPB, magnesium sulfate IVPB, melatonin, naloxone, ondansetron, oxyCODONE, potassium chloride in water, potassium chloride in water, potassium chloride in water, potassium chloride in water, potassium chloride, potassium chloride, potassium chloride, potassium chloride, prochlorperazine, sodium phosphate IVPB, sodium phosphate IVPB, sodium phosphate IVPB, sodium phosphate IVPB, sodium phosphate IVPB    Labs: Pertinent Labs Reviewed  Clinical Chemistry:  Recent Labs   Lab 08/11/22  1019 08/16/22  0524   * 137   K 3.4* 3.5    103   CO2 21* 25   * 87   BUN 8 5*   CREATININE 0.5 0.5   CALCIUM 8.7 8.6*   PROT 6.6 5.6*   ALBUMIN 3.3* 2.6*   BILITOT 15.2* 18.9*   ALKPHOS 115 67   AST 1,733* 949*   ALT 1,319*  827*   ANIONGAP 8 9   LIPASE 34  --     < > = values in this interval not displayed.     CBC:   Recent Labs   Lab 08/16/22  0524   WBC 5.01   RBC 4.99   HGB 13.6   HCT 42.5      MCV 85   MCH 27.3   MCHC 32.0     Monitor and Evaluation  Food and Nutrient Intake: energy intake, food and beverage intake  Food and Nutrient Adminstration: diet order  Knowledge/Beliefs/Attitudes: food and nutrition knowledge/skill  Physical Activity and Function: nutrition-related ADLs and IADLs  Anthropometric Measurements: weight, weight change, body mass index  Biochemical Data, Medical Tests and Procedures: electrolyte and renal panel, lipid profile, gastrointestinal profile, glucose/endocrine profile, inflammatory profile  Nutrition-Focused Physical Findings: overall appearance     Nutrition Risk  Level of Risk/Frequency of Follow-up: moderate - high     Nutrition Follow-Up  RD Follow-up?: Yes      Charlene Chavarria RD, JAY 08/16/2022 9:58 AM

## 2022-08-17 LAB
ALBUMIN SERPL BCP-MCNC: 2.8 G/DL (ref 3.5–5.2)
ALP SERPL-CCNC: 78 U/L (ref 55–135)
ALT SERPL W/O P-5'-P-CCNC: 774 U/L (ref 10–44)
ANION GAP SERPL CALC-SCNC: 9 MMOL/L (ref 8–16)
AST SERPL-CCNC: 988 U/L (ref 10–40)
BASOPHILS # BLD AUTO: 0.09 K/UL (ref 0–0.2)
BASOPHILS NFR BLD: 1.8 % (ref 0–1.9)
BILIRUB SERPL-MCNC: 21.1 MG/DL (ref 0.1–1)
BUN SERPL-MCNC: 5 MG/DL (ref 6–20)
CALCIUM SERPL-MCNC: 8.7 MG/DL (ref 8.7–10.5)
CHLORIDE SERPL-SCNC: 102 MMOL/L (ref 95–110)
CO2 SERPL-SCNC: 26 MMOL/L (ref 23–29)
CREAT SERPL-MCNC: 0.4 MG/DL (ref 0.5–1.4)
DIFFERENTIAL METHOD: ABNORMAL
EOSINOPHIL # BLD AUTO: 0.1 K/UL (ref 0–0.5)
EOSINOPHIL NFR BLD: 2.2 % (ref 0–8)
ERYTHROCYTE [DISTWIDTH] IN BLOOD BY AUTOMATED COUNT: 21.1 % (ref 11.5–14.5)
EST. GFR  (NO RACE VARIABLE): >60 ML/MIN/1.73 M^2
GLUCOSE SERPL-MCNC: 91 MG/DL (ref 70–110)
HCT VFR BLD AUTO: 48.1 % (ref 37–48.5)
HGB BLD-MCNC: 14.8 G/DL (ref 12–16)
IMM GRANULOCYTES # BLD AUTO: 0.01 K/UL (ref 0–0.04)
IMM GRANULOCYTES NFR BLD AUTO: 0.2 % (ref 0–0.5)
LYMPHOCYTES # BLD AUTO: 1.3 K/UL (ref 1–4.8)
LYMPHOCYTES NFR BLD: 25.9 % (ref 18–48)
MAGNESIUM SERPL-MCNC: 2.1 MG/DL (ref 1.6–2.6)
MCH RBC QN AUTO: 27.6 PG (ref 27–31)
MCHC RBC AUTO-ENTMCNC: 30.8 G/DL (ref 32–36)
MCV RBC AUTO: 90 FL (ref 82–98)
MITOCHONDRIA M2 IGG SER-ACNC: <20 UNITS (ref 0–20)
MONOCYTES # BLD AUTO: 0.7 K/UL (ref 0.3–1)
MONOCYTES NFR BLD: 13.2 % (ref 4–15)
NEUTROPHILS # BLD AUTO: 2.9 K/UL (ref 1.8–7.7)
NEUTROPHILS NFR BLD: 56.7 % (ref 38–73)
NRBC BLD-RTO: 0 /100 WBC
PLATELET # BLD AUTO: 261 K/UL (ref 150–450)
PMV BLD AUTO: 9.8 FL (ref 9.2–12.9)
POTASSIUM SERPL-SCNC: 3.6 MMOL/L (ref 3.5–5.1)
PROT SERPL-MCNC: 6 G/DL (ref 6–8.4)
RBC # BLD AUTO: 5.37 M/UL (ref 4–5.4)
SODIUM SERPL-SCNC: 137 MMOL/L (ref 136–145)
WBC # BLD AUTO: 5.09 K/UL (ref 3.9–12.7)

## 2022-08-17 PROCEDURE — 25000003 PHARM REV CODE 250: Performed by: STUDENT IN AN ORGANIZED HEALTH CARE EDUCATION/TRAINING PROGRAM

## 2022-08-17 PROCEDURE — 63600175 PHARM REV CODE 636 W HCPCS: Performed by: NURSE PRACTITIONER

## 2022-08-17 PROCEDURE — 25000003 PHARM REV CODE 250: Performed by: INTERNAL MEDICINE

## 2022-08-17 PROCEDURE — 85025 COMPLETE CBC W/AUTO DIFF WBC: CPT | Performed by: NURSE PRACTITIONER

## 2022-08-17 PROCEDURE — 80053 COMPREHEN METABOLIC PANEL: CPT | Performed by: NURSE PRACTITIONER

## 2022-08-17 PROCEDURE — 36415 COLL VENOUS BLD VENIPUNCTURE: CPT | Performed by: NURSE PRACTITIONER

## 2022-08-17 PROCEDURE — 83735 ASSAY OF MAGNESIUM: CPT | Performed by: STUDENT IN AN ORGANIZED HEALTH CARE EDUCATION/TRAINING PROGRAM

## 2022-08-17 PROCEDURE — 12000002 HC ACUTE/MED SURGE SEMI-PRIVATE ROOM

## 2022-08-17 RX ORDER — SODIUM CHLORIDE 9 MG/ML
INJECTION, SOLUTION INTRAVENOUS CONTINUOUS
Status: DISCONTINUED | OUTPATIENT
Start: 2022-08-17 | End: 2022-08-18 | Stop reason: HOSPADM

## 2022-08-17 RX ADMIN — ONDANSETRON 4 MG: 2 INJECTION INTRAMUSCULAR; INTRAVENOUS at 07:08

## 2022-08-17 RX ADMIN — SODIUM CHLORIDE: 0.9 INJECTION, SOLUTION INTRAVENOUS at 03:08

## 2022-08-17 RX ADMIN — POTASSIUM CHLORIDE 20 MEQ: 1500 TABLET, EXTENDED RELEASE ORAL at 11:08

## 2022-08-17 RX ADMIN — SODIUM CHLORIDE: 0.9 INJECTION, SOLUTION INTRAVENOUS at 10:08

## 2022-08-17 NOTE — PROGRESS NOTES
[facility]    Progress Note    Patient Name: Kina Pat  MRN: [id number]  Patient Class: IP- Inpatient   Admission Date: 8/11/2022  9:46 AM  Length of Stay: 5  Attending Physician: Don Espana MD  Primary Care Provider: [facility]  Face-to-Face encounter date: 08/17/2022  Code status:  Chief Complaint: Abdominal Pain and Jaundice        Subjective:    HPI:  Ms. Pat is a 47-year-old female with PMHx chronic migraines and a remote history of seizure disorder. She presented to [facility] Emergency room with complaints abdominal pain for several weeks. Associated symptoms nausea and vomiting. Today she needed to come to the ED because she was becoming lethargic at times due to abdominal pain. She endorses being anorectic and has lost 10 lbs is during this time. She endorses being very fatigued and not being able to do her daily functioning. Her stools have been light and urine dark. She denies any history of hepatitis, pancreatitis, or other GI diseases. Patient denies being around anyone sick or going out of the country. He diet has not changed.Denies fever or chills. In the ED her initial workup Potassium 3.4, Sodium 133, Bilirubin 15.2, AST 1433, ALT 1319, Lipase 34. Urinalysis resulted WBC 40, few bacteria. She was treated in the ED with Rocephin 1g IV. Abdominal ultrasound impressions were gallbladder thickening with no stones. GI has been consulted. Abdominal/pelvis CT pending, Hepatitis panel pending. Patient will be admitted to Hospital Medicine for further workup and medical management.     Interval History:   Patient presented to the ED with abdominal pain for the past few weeks with jaundice for the  Past 3-4 days.  She reports tylenol use- level checked and normal. Admission labs significant for bilirubin of 15, AST 1733, ALT 1319. Lipase was normal.  Alk phosph was normal. INR 1.2.  US in the ED with pronounced gallbladder thickening  but normal CBD diameter. No intrahepatic biliary ductal dilatation seen.  CT abd and pelvis ordered and revealed diffuse wall thickening of the gallbladder with pericholecystic edema and a small enhancing nodule anterior to the gallbladder that could be a prominent node. She was admitted and GI and General surgery consulted.  I started Zosyn given concern for acute cholecystitis. Seen by GI who started NAC while awaiting tylenol level.  Discussed with GI after normal tylenol level revealed and preferred to continue it for now but it has made patient nauseated and she has declined it.  8/13 RN reports declining Zosyn as well as also feels it is making her nauseated. Labs for viral hepatitis, autoimmune disorders, etc, sent but these are send outs.  Seen by General Surgery and felt that without biliary dilatation, it is unlikely an obstructive process and it is felt that more than likely represents acute hepatitis as the etiology of her thickened and edematous gallbladder.It is also felt that she does not have acalculous cholecystitis given her hemodynamic stability.     8/12: Patient is quite nauseated at the time of my exam.  She had just received IV NAC.  History limited due to her nausea.  Discussed lab findings and the opinion of GI.  Tylenol level checked given frequent use and was normal.     8/13: Symptoms unchanged though the nausea that she was experiencing yesterday is less. Bilirubin has increased to 18.  LFTs remain the same. She is understandably frustrated at the lack of diagnosis and thus lack of treatment plan and time line. We went over results and various clinical scenarios.     8/14: Denies current abdominal pain. No present nausea.  Thankfully, bilirubin is improving today.  LFTs slightly improved.  JOSE returned as negative.  I called lab to check on results of acute hepatitis panel and unfortunately it does not appear that lab received the sample 8/11.  Lab kindly stat manuel a different sample-  expect results Tuesday/wednesday as this is a send out.  Discussed with GI given persistent elevation in numbers and after evaluation, they have recommended to continue present course while trending and waiting for send out test results.     8/15: Denies current abdominal pain. No present nausea.  Bilirubin increased to 20.  LFTs continuing to slowly improve and are 1400 today.  Seen by Dr. Green who ordered a doppler Liver US as well as other labs to evaluate her hepatitis.  Liver biopsy discussed in the event these are unrevealing. I am hopeful her viral hepatitis panel will result tomorrow. Patient supplements she has also been on gabapentin for the last 2.5 months.  I have reviewed and there is post marketing report of hepatotoxicity but no specifics. I passed this along to Dr. Green.       8/16:  Liver enzymes trending downwards and hepatitis panel positive for hepatitis B.  case discussed with gastroenterologist and if numbers continue to improve, patient can be discharged tomorrow. Education be provided to patient on vaccination of members of household.  Patient is doing well.No concerns/issues overnight reported by the patient or the nursing staff.    8/17:  Patient was counseled extensively on diagnosis of hepatitis B and handout was given.  Slight bump in liver function and bilirubin.  Would monitor for 1 more day.    Review of Systems All other Review of Systems were found to be negative expect for that mentioned already in HPI.     Objective:     Vitals:    08/16/22 1935 08/16/22 2351 08/17/22 0406 08/17/22 0830   BP: 120/71 114/73 120/84 125/85   BP Location:       Patient Position:       Pulse: 78 73 75 93   Resp: 18 18 16 18   Temp: 98.2 °F (36.8 °C) 97.9 °F (36.6 °C) 98.4 °F (36.9 °C) 98.2 °F (36.8 °C)   TempSrc:    Oral   SpO2: 98% 96% 97% 97%   Weight:       Height:            Vitals reviewed.  Constitutional: No distress.   HENT: NC  Head: Atraumatic.   Cardiovascular: Normal rate, regular  rhythm and normal heart sounds.   Pulmonary/Chest: Effort normal. No wheezes.   Abdominal: Soft. Bowel sounds are normal. No distension and no mass. No tenderness  Neurological: Alert.   Skin: Skin is warm and dry.   Psych: Appropriate mood and affect    Following labs were Reviewed   CBC:  Recent Labs   Lab 08/17/22 0619   WBC 5.09   HGB 14.8   HCT 48.1        CMP:  Recent Labs   Lab 08/17/22 0619   CALCIUM 8.7   ALBUMIN 2.8*   PROT 6.0      K 3.6   CO2 26      BUN 5*   CREATININE 0.4*   ALKPHOS 78   *   *   BILITOT 21.1*       Micro Results  Microbiology Results (last 7 days)     Procedure Component Value Units Date/Time    Urine culture [[id number]] Collected: 08/11/22 1019    Order Status: Completed Specimen: Urine Updated: 08/14/22 0648     Urine Culture, Routine No growth    Narrative:      Specimen Source->Urine           Radiology Reports  CT Abdomen Pelvis With Contrast  Result Date: 8/11/2022  IMPRESSION: Partially contracted gallbladder demonstrating diffuse wall thickening and pericholecystic edema. A small enhancing nodule anterior to the gallbladder could reflect a mildly prominent lymph node. Correlate with evidence of hepatocellular disease or acalculus cholecystitis. Duplicated right renal collecting system with chronic obstruction of the lower pole moiety by a large proximal right ureteral calculus. There is diffuse cortical thinning involving mid to lower right kidney. There are nonobstructing left renal calculi. Small amount of ascites. Electronically signed by:  Bev Hernandez MD  8/11/2022 3:18 PM CDT Workstation: [id number]    US Abdomen Limited (Gallbladder)  Result Date: 8/11/2022   IMPRESSION: 1. Normal size and sonographic appearance of the liver. 2. Pronounced gallbladder thickening without gallstones, nonspecific. This could be due to intrinsic liver disease and or hypoproteinemia, or acalculous cholecystitis. 3. Multiple simple right renal cysts.  Electronically signed by:  Raulito Alamo MD  8/11/2022 12:44 PM CDT Workstation: [id number]    US Liver with Doppler (xpd)  Result Date: 8/15/2022   IMPRESSION: No significant abnormality demonstrated. Electronically signed by:  Bev Hernandez MD  8/15/2022 4:42 PM CDT Workstation: [id number]       Meds  Scheduled Meds:   sodium chloride 0.9%  3 mL Intravenous Q8H     Continuous Infusions:  PRN Meds:.aluminum-magnesium hydroxide-simethicone, calcium chloride IVPB, calcium chloride IVPB, calcium chloride IVPB, dextrose 50%, dextrose 50%, glucagon (human recombinant), glucose, glucose, magnesium oxide, magnesium sulfate IVPB, magnesium sulfate IVPB, magnesium sulfate IVPB, magnesium sulfate IVPB, melatonin, naloxone, ondansetron, oxyCODONE, potassium chloride in water, potassium chloride in water, potassium chloride in water, potassium chloride in water, potassium chloride, potassium chloride, potassium chloride, potassium chloride, prochlorperazine, sodium phosphate IVPB, sodium phosphate IVPB, sodium phosphate IVPB, sodium phosphate IVPB, sodium phosphate IVPB.    Assessment & Plan:     Active Hospital Problems    Diagnosis    *Acute viral hepatitis B without hepatic coma    Jaundice     Plan:      -GI on board  -continue to trend  --prn IV antiemetic      Discharge Planning:   Is the patient medically ready for discharge?: no    Reason for patient still in hospital (select all that apply): Patient trending condition and Treatment    Above encounter included review of the medical records, interviewing and examining the patient face-to-face, discussion with family and other health care providers, ordering and interpreting lab/test results and formulating a plan of care.     Medical Decision Making:      [_] Low Complexity  [_] Moderate Complexity  [x] High Complexity      Don Espana MD  Department of Hospital Medicine   [facility]

## 2022-08-18 VITALS
RESPIRATION RATE: 18 BRPM | BODY MASS INDEX: 34.37 KG/M2 | HEART RATE: 90 BPM | SYSTOLIC BLOOD PRESSURE: 124 MMHG | HEIGHT: 66 IN | OXYGEN SATURATION: 98 % | WEIGHT: 213.88 LBS | DIASTOLIC BLOOD PRESSURE: 78 MMHG | TEMPERATURE: 98 F

## 2022-08-18 PROBLEM — G43.909 MIGRAINE: Status: ACTIVE | Noted: 2020-06-16

## 2022-08-18 LAB
ALBUMIN SERPL BCP-MCNC: 2.6 G/DL (ref 3.5–5.2)
ALP SERPL-CCNC: 74 U/L (ref 55–135)
ALT SERPL W/O P-5'-P-CCNC: 611 U/L (ref 10–44)
ANION GAP SERPL CALC-SCNC: 10 MMOL/L (ref 8–16)
AST SERPL-CCNC: 788 U/L (ref 10–40)
BILIRUB SERPL-MCNC: 21.1 MG/DL (ref 0.1–1)
BUN SERPL-MCNC: 6 MG/DL (ref 6–20)
CALCIUM SERPL-MCNC: 8.3 MG/DL (ref 8.7–10.5)
CHLORIDE SERPL-SCNC: 107 MMOL/L (ref 95–110)
CO2 SERPL-SCNC: 21 MMOL/L (ref 23–29)
CREAT SERPL-MCNC: 0.3 MG/DL (ref 0.5–1.4)
EST. GFR  (NO RACE VARIABLE): >60 ML/MIN/1.73 M^2
GLUCOSE SERPL-MCNC: 86 MG/DL (ref 70–110)
POTASSIUM SERPL-SCNC: 3.5 MMOL/L (ref 3.5–5.1)
PROT SERPL-MCNC: 5.6 G/DL (ref 6–8.4)
SODIUM SERPL-SCNC: 138 MMOL/L (ref 136–145)

## 2022-08-18 PROCEDURE — 80053 COMPREHEN METABOLIC PANEL: CPT | Performed by: NURSE PRACTITIONER

## 2022-08-18 PROCEDURE — 25000003 PHARM REV CODE 250: Performed by: NURSE PRACTITIONER

## 2022-08-18 PROCEDURE — 36415 COLL VENOUS BLD VENIPUNCTURE: CPT | Performed by: NURSE PRACTITIONER

## 2022-08-18 RX ORDER — ONDANSETRON 4 MG/1
4 TABLET, ORALLY DISINTEGRATING ORAL EVERY 6 HOURS PRN
Qty: 30 TABLET | Refills: 0 | Status: SHIPPED | OUTPATIENT
Start: 2022-08-18

## 2022-08-18 RX ADMIN — Medication 6 MG: at 01:08

## 2022-08-18 NOTE — PLAN OF CARE
08/18/22 1040   Final Note   Assessment Type Final Discharge Note   Anticipated Discharge Disposition Home   What phone number can be called within the next 1-3 days to see how you are doing after discharge? [phone]   Hospital Resources/Appts/Education Provided Appointments scheduled and added to AVS   Post-Acute Status   Discharge Delays None known at this time     Pt is discharging home with no identified CM needs. Pt is clear to discharge from a CM standpoint.

## 2022-08-18 NOTE — DISCHARGE SUMMARY
[facility] Medicine  Discharge Summary      Patient Name: Kina Pat  MRN: [id number]  Patient Class: IP- Inpatient  Admission Date: 8/11/2022  Hospital Length of Stay: 6 days  Discharge Date and Time:  08/18/2022 10:14 AM  Attending Physician: Don Espana MD   Discharging Provider: Don Espana MD  Primary Care Provider: [facility]      HPI:   Ms. Pat is a 47-year-old female with PMHx chronic migraines and a remote history of seizure disorder. She presented to [facility] Emergency room with complaints abdominal pain for several weeks. Associated symptoms nausea and vomiting. Today she needed to come to the ED because she was becoming lethargic at times due to abdominal pain. She endorses being anorectic and has lost 10 lbs is during this time. She endorses being very fatigued and not being able to do her daily functioning. Her stools have been light and urine dark. She denies any history of hepatitis, pancreatitis, or other GI diseases. Patient denies being around anyone sick or going out of the country. He diet has not changed.Denies fever or chills. In the ED her initial workup Potassium 3.4, Sodium 133, Bilirubin 15.2, AST 1433, ALT 1319, Lipase 34. Urinalysis resulted WBC 40, few bacteria. She was treated in the ED with Rocephin 1g IV. Abdominal ultrasound impressions were gallbladder thickening with no stones. GI has been consulted. Abdominal/pelvis CT pending, Hepatitis panel pending. Patient will be admitted to Hospital Medicine for further workup and medical management.       * No surgery found *      Hospital Course:   Patient presented to the ED with abdominal pain for the past few weeks with jaundice for the  Past 3-4 days.  She reports tylenol use- level checked and normal. Admission labs significant for bilirubin of 15, AST 1733, ALT 1319. Lipase was normal.  Alk phosph was normal. INR 1.2.  US in the ED with pronounced gallbladder  thickening but normal CBD diameter. No intrahepatic biliary ductal dilatation seen.  CT abd and pelvis ordered and revealed diffuse wall thickening of the gallbladder with pericholecystic edema and a small enhancing nodule anterior to the gallbladder that could be a prominent node. She was admitted and GI and General surgery consulted.  I started Zosyn given concern for acute cholecystitis. Seen by GI who started NAC while awaiting tylenol level.  Discussed with GI after normal tylenol level revealed and preferred to continue it for now but it has made patient nauseated and she has declined it.  8/13 RN reports declining Zosyn as well as also feels it is making her nauseated. Labs for viral hepatitis, autoimmune disorders, etc, sent but these are send outs.  Seen by General Surgery and felt that without biliary dilatation, it is unlikely an obstructive process and it is felt that more than likely represents acute hepatitis as the etiology of her thickened and edematous gallbladder.It is also felt that she does not have acalculous cholecystitis given her hemodynamic stability.      8/12: Patient is quite nauseated at the time of my exam.  She had just received IV NAC.  History limited due to her nausea.  Discussed lab findings and the opinion of GI.  Tylenol level checked given frequent use and was normal.      8/13: Symptoms unchanged though the nausea that she was experiencing yesterday is less. Bilirubin has increased to 18.  LFTs remain the same. She is understandably frustrated at the lack of diagnosis and thus lack of treatment plan and time line. We went over results and various clinical scenarios.      8/14: Denies current abdominal pain. No present nausea.  Thankfully, bilirubin is improving today.  LFTs slightly improved.  JOSE returned as negative.  I called lab to check on results of acute hepatitis panel and unfortunately it does not appear that lab received the sample 8/11.  Lab kindly stat manuel jamison  different sample- expect results Tuesday/wednesday as this is a send out.  Discussed with GI given persistent elevation in numbers and after evaluation, they have recommended to continue present course while trending and waiting for send out test results.      8/15: Denies current abdominal pain. No present nausea.  Bilirubin increased to 20.  LFTs continuing to slowly improve and are 1400 today.  Seen by Dr. Green who ordered a doppler Liver US as well as other labs to evaluate her hepatitis.  Liver biopsy discussed in the event these are unrevealing. I am hopeful her viral hepatitis panel will result tomorrow. Patient supplements she has also been on gabapentin for the last 2.5 months.  I have reviewed and there is post marketing report of hepatotoxicity but no specifics. I passed this along to Dr. Green.     8/16:  Liver enzymes trending downwards and hepatitis panel positive for hepatitis B.  case discussed with gastroenterologist and if numbers continue to improve, patient can be discharged tomorrow. Education be provided to patient on vaccination of members of household.  Patient is doing well.No concerns/issues overnight reported by the patient or the nursing staff.     8/17:  Patient was counseled extensively on diagnosis of hepatitis B and handout was given.  Slight bump in liver function and bilirubin.  Would monitor for 1 more day.    8/18:  Bilirubin trending downwards, patient subsequently discharged to have repeat CMP done within a week and follow-up with gastroenterologist.    Physical examination on discharge:  Constitutional: No distress.   HENT: NC  Head: Atraumatic.   Cardiovascular: Normal rate, regular rhythm and normal heart sounds.   Pulmonary/Chest: Effort normal. No wheezes.   Abdominal: Soft. Bowel sounds are normal. No distension and no mass. No tenderness  Neurological: Alert.   Skin: Skin is warm and dry.   Psych: Appropriate mood and affect    I have seen the patient on the day of  discharge and reviewed the discharge instructions as outlined.         Goals of Care Treatment Preferences:  Code Status: Full Code      Consults:   Consults (From admission, onward)        Status Ordering Provider     Inpatient consult to Gastroenterology  Once        Provider:  Trevin Green MD    Completed ANASTASIYA GLEZ     Inpatient consult to General Surgery  Once        Provider:  Fernando Dozier MD    Completed OBIE LOVE     Inpatient consult to Gastroenterology  Once        Provider:  Trevin Green MD    Completed GREGG PHILLIPS JR     Inpatient consult to Hospitalist  Once        Provider:  Obie Love MD    Acknowledged GREGG PHILLIPS JR          No new Assessment & Plan notes have been filed under this hospital service since the last note was generated.  Service: Hospital Medicine    Final Active Diagnoses:    Diagnosis Date Noted POA    PRINCIPAL PROBLEM:  Acute viral hepatitis B without hepatic coma [B16.9] 08/16/2022 Yes    Jaundice [R17] 08/11/2022 Yes      Problems Resolved During this Admission:    Diagnosis Date Noted Date Resolved POA    Hyponatremia [E87.1] 08/12/2022 08/16/2022 Yes    Hepatitis [K75.9] 08/12/2022 08/16/2022 Yes    Hypokalemia [E87.6] 08/11/2022 08/16/2022 Yes       Discharged Condition: good    Disposition: Home or Self Care    Follow Up:   Follow-up Information     [facility]. Go on 8/19/2022.    Why: 10 am  Contact information:  [address] [address] [address] [phone]           Trevin Green MD Follow up in 1 week(s).    Specialty: Gastroenterology  Contact information:  [address] [address] [facility] [address]  [phone]                       Patient Instructions:      Comprehensive metabolic panel   Standing Status: Future Standing Exp. Date: 10/17/23     Activity as tolerated       Significant Diagnostic Studies: Labs:   CMP   Recent Labs   Lab 08/17/22  0619 08/18/22  0718     138   K 3.6 3.5    107   CO2 26 21*   GLU 91 86   BUN 5* 6   CREATININE 0.4* 0.3*   CALCIUM 8.7 8.3*   PROT 6.0 5.6*   ALBUMIN 2.8* 2.6*   BILITOT 21.1* 21.1*   ALKPHOS 78 74   * 788*   * 611*   ANIONGAP 9 10   , CBC   Recent Labs   Lab 08/17/22  0619   WBC 5.09   HGB 14.8   HCT 48.1      , INR   Lab Results   Component Value Date    INR 1.2 08/14/2022    INR 1.2 08/12/2022   , Lipid Panel No results found for: CHOL, HDL, LDLCALC, TRIG, CHOLHDL and All labs within the past 24 hours have been reviewed  Microbiology:   Blood Culture No results found for: LABBLOO and Urine Culture    Lab Results   Component Value Date    LABURIN No growth 08/11/2022       Pending Diagnostic Studies:     None         Medications:  Reconciled Home Medications:      Medication List      STOP taking these medications    EMGALITY  mg/mL Pnij  Generic drug: galcanezumab-gnlm     NURTEC 75 mg odt  Generic drug: rimegepant     promethazine 25 MG tablet  Commonly known as: PHENERGAN     UBRELVY 100 mg tablet  Generic drug: ubrogepant            Indwelling Lines/Drains at time of discharge:   Lines/Drains/Airways     None                 Time spent on the discharge of patient: 40 minutes         Don Espana MD  Department of Hospital Medicine  [facility]

## 2022-08-18 NOTE — HOSPITAL COURSE
Ms. Pat is a 47-year-old female with PMHx chronic migraines and a remote history of seizure disorder. She presented to [facility] Emergency room with complaints abdominal pain for several weeks. Associated symptoms nausea and vomiting. Today she needed to come to the ED because she was becoming lethargic at times due to abdominal pain. She endorses being anorectic and has lost 10 lbs is during this time. She endorses being very fatigued and not being able to do her daily functioning. Her stools have been light and urine dark. She denies any history of hepatitis, pancreatitis, or other GI diseases. Patient denies being around anyone sick or going out of the country. He diet has not changed.Denies fever or chills. In the ED her initial workup Potassium 3.4, Sodium 133, Bilirubin 15.2, AST 1433, ALT 1319, Lipase 34. Urinalysis resulted WBC 40, few bacteria. She was treated in the ED with Rocephin 1g IV. Abdominal ultrasound impressions were gallbladder thickening with no stones. GI has been consulted. Abdominal/pelvis CT pending, Hepatitis panel pending. Patient will be admitted to Hospital Medicine for further workup and medical management.      Interval History:   Patient presented to the ED with abdominal pain for the past few weeks with jaundice for the  Past 3-4 days.  She reports tylenol use- level checked and normal. Admission labs significant for bilirubin of 15, AST 1733, ALT 1319. Lipase was normal.  Alk phosph was normal. INR 1.2.  US in the ED with pronounced gallbladder thickening but normal CBD diameter. No intrahepatic biliary ductal dilatation seen.  CT abd and pelvis ordered and revealed diffuse wall thickening of the gallbladder with pericholecystic edema and a small enhancing nodule anterior to the gallbladder that could be a prominent node. She was admitted and GI and General surgery consulted.  I started Zosyn given concern for acute cholecystitis. Seen by GI who started NAC while awaiting  tylenol level.  Discussed with GI after normal tylenol level revealed and preferred to continue it for now but it has made patient nauseated and she has declined it.  8/13 RN reports declining Zosyn as well as also feels it is making her nauseated. Labs for viral hepatitis, autoimmune disorders, etc, sent but these are send outs.  Seen by General Surgery and felt that without biliary dilatation, it is unlikely an obstructive process and it is felt that more than likely represents acute hepatitis as the etiology of her thickened and edematous gallbladder.It is also felt that she does not have acalculous cholecystitis given her hemodynamic stability.      8/12: Patient is quite nauseated at the time of my exam.  She had just received IV NAC.  History limited due to her nausea.  Discussed lab findings and the opinion of GI.  Tylenol level checked given frequent use and was normal.      8/13: Symptoms unchanged though the nausea that she was experiencing yesterday is less. Bilirubin has increased to 18.  LFTs remain the same. She is understandably frustrated at the lack of diagnosis and thus lack of treatment plan and time line. We went over results and various clinical scenarios.      8/14: Denies current abdominal pain. No present nausea.  Thankfully, bilirubin is improving today.  LFTs slightly improved.  JOSE returned as negative.  I called lab to check on results of acute hepatitis panel and unfortunately it does not appear that lab received the sample 8/11.  Lab kindly stat manuel a different sample- expect results Tuesday/wednesday as this is a send out.  Discussed with GI given persistent elevation in numbers and after evaluation, they have recommended to continue present course while trending and waiting for send out test results.      8/15: Denies current abdominal pain. No present nausea.  Bilirubin increased to 20.  LFTs continuing to slowly improve and are 1400 today.  Seen by Dr. Green who ordered a doppler  Liver US as well as other labs to evaluate her hepatitis.  Liver biopsy discussed in the event these are unrevealing. I am hopeful her viral hepatitis panel will result tomorrow. Patient supplements she has also been on gabapentin for the last 2.5 months.  I have reviewed and there is post marketing report of hepatotoxicity but no specifics. I passed this along to Dr. Green.        8/16:  Liver enzymes trending downwards and hepatitis panel positive for hepatitis B.  case discussed with gastroenterologist and if numbers continue to improve, patient can be discharged tomorrow. Education be provided to patient on vaccination of members of household.  Patient is doing well.No concerns/issues overnight reported by the patient or the nursing staff.     8/17:  Patient was counseled extensively on diagnosis of hepatitis B and handout was given.  Slight bump in liver function and bilirubin.  Would monitor for 1 more day.

## 2022-08-18 NOTE — PROGRESS NOTES
Pt prepared for d/c. Discharge instructions given to pt. Spouse at bedside to transport pt home via private vehicle. All questions and concerns addressed. Pt ambulated to vehicle.

## 2022-08-26 ENCOUNTER — LAB VISIT (OUTPATIENT)
Dept: LAB | Facility: HOSPITAL | Age: 47
End: 2022-08-26
Attending: STUDENT IN AN ORGANIZED HEALTH CARE EDUCATION/TRAINING PROGRAM
Payer: MEDICAID

## 2022-08-26 DIAGNOSIS — K75.9 HEPATITIS: ICD-10-CM

## 2022-08-26 LAB
ALBUMIN SERPL BCP-MCNC: 3.3 G/DL (ref 3.5–5.2)
ALP SERPL-CCNC: 70 U/L (ref 55–135)
ALT SERPL W/O P-5'-P-CCNC: 519 U/L (ref 10–44)
ANION GAP SERPL CALC-SCNC: 12 MMOL/L (ref 8–16)
AST SERPL-CCNC: 946 U/L (ref 10–40)
BILIRUB SERPL-MCNC: 36.7 MG/DL (ref 0.1–1)
BUN SERPL-MCNC: 7 MG/DL (ref 6–20)
CALCIUM SERPL-MCNC: 9.3 MG/DL (ref 8.7–10.5)
CHLORIDE SERPL-SCNC: 100 MMOL/L (ref 95–110)
CO2 SERPL-SCNC: 25 MMOL/L (ref 23–29)
CREAT SERPL-MCNC: 0.4 MG/DL (ref 0.5–1.4)
EST. GFR  (NO RACE VARIABLE): >60 ML/MIN/1.73 M^2
GLUCOSE SERPL-MCNC: 114 MG/DL (ref 70–110)
POTASSIUM SERPL-SCNC: 3.6 MMOL/L (ref 3.5–5.1)
PROT SERPL-MCNC: 6.9 G/DL (ref 6–8.4)
SODIUM SERPL-SCNC: 137 MMOL/L (ref 136–145)

## 2022-08-26 PROCEDURE — 36415 COLL VENOUS BLD VENIPUNCTURE: CPT | Performed by: STUDENT IN AN ORGANIZED HEALTH CARE EDUCATION/TRAINING PROGRAM

## 2022-08-26 PROCEDURE — 80053 COMPREHEN METABOLIC PANEL: CPT | Performed by: STUDENT IN AN ORGANIZED HEALTH CARE EDUCATION/TRAINING PROGRAM

## 2023-06-28 NOTE — ED NOTES
"Upon going to swab pt for Covid pt stated "I have a problem with that, it's a [employer] thing, I don't want to be tested".  "
Assumed care and report received from ANIL Rivera.   
Resting in stretcher with RR e/u. C/o headache rated 8/10 with abd pain that is tolerable. Still refusing COVID swab. IVFs infusing with no difficulty. No other complaints/requests. VSS. Admit pending.   
[[id number]]